# Patient Record
Sex: FEMALE | URBAN - METROPOLITAN AREA
[De-identification: names, ages, dates, MRNs, and addresses within clinical notes are randomized per-mention and may not be internally consistent; named-entity substitution may affect disease eponyms.]

---

## 2017-04-19 ENCOUNTER — TELEPHONE (OUTPATIENT)
Dept: FAMILY MEDICINE | Facility: CLINIC | Age: 57
End: 2017-04-19

## 2017-04-19 NOTE — LETTER
[facility]  [address] [place] [id number] [phone]                                                                                                April 26, 2017    Jenny Moss  [address] [address]  [place] [address]        Dear Ms. Moss,    At [facility] we care about your health and well-being. A review of your chart has indicated that you are due for a pap and physical exam. Please contact us at [phone] to schedule your appointment.     If you have already had one or all of the above screening tests at another facility, please call us to update your chart.     You may contact the clinic at [phone] if you have any questions or concerns about this request.      Sincerely,    [facility] Care Staff/ sd

## 2017-04-19 NOTE — TELEPHONE ENCOUNTER
Panel Management Review      Patient has the following on her problem list: None      Composite cancer screening  Chart review shows that this patient is due/due soon for the following Pap Smear, Mammogram and Colonoscopy  Summary:    Patient is due/failing the following:   COLONOSCOPY, LDL, MAMMOGRAM and PAP    Action needed:   Patient needs office visit for a physical with pap smear and fasting labs.    Type of outreach:    Phone, left message for patient to call back.     Questions for provider review:    None                                                                                                                                    Vandana Farooq MA      Chart routed to Care Team .

## 2017-06-10 ENCOUNTER — HEALTH MAINTENANCE LETTER (OUTPATIENT)
Age: 57
End: 2017-06-10

## 2017-10-07 ENCOUNTER — NURSE TRIAGE (OUTPATIENT)
Dept: NURSING | Facility: CLINIC | Age: 57
End: 2017-10-07

## 2017-10-07 DIAGNOSIS — J20.9 ACUTE BRONCHITIS: Primary | ICD-10-CM

## 2017-10-07 DIAGNOSIS — J20.9 ACUTE BRONCHITIS: ICD-10-CM

## 2017-10-07 RX ORDER — ALBUTEROL SULFATE 90 UG/1
2 AEROSOL, METERED RESPIRATORY (INHALATION) EVERY 6 HOURS PRN
Qty: 1 INHALER | Refills: 1 | Status: CANCELLED | OUTPATIENT
Start: 2017-10-07

## 2017-10-07 RX ORDER — ALBUTEROL SULFATE 90 UG/1
2 AEROSOL, METERED RESPIRATORY (INHALATION) EVERY 6 HOURS PRN
Qty: 1 INHALER | Refills: 0 | OUTPATIENT
Start: 2017-10-07 | End: 2018-11-07

## 2017-10-07 NOTE — TELEPHONE ENCOUNTER
"  Reason for Disposition    [1] Request for URGENT new prescription or refill of \"essential\" medication (i.e., likelihood of harm to patient if not taken) AND [2] triager unable to fill per unit policy    Additional Information    Negative: Drug overdose and nurse unable to answer question    Negative: Caller requesting information not related to medicine    Negative: Caller requesting a prescription for Strep throat and has a positive culture result    Negative: Rash while taking a medication or within 3 days of stopping it    Negative: Immunization reaction suspected    Negative: [1] Asthma and [2] having symptoms of asthma (cough, wheezing, etc)    Negative: MORE THAN A DOUBLE DOSE of a prescription or over-the-counter (OTC) drug    Negative: [1] DOUBLE DOSE (an extra dose or lesser amount) of over-the-counter (OTC) drug AND [2] any symptoms (e.g., dizziness, nausea, pain, sleepiness)    Negative: [1] DOUBLE DOSE (an extra dose or lesser amount) of prescription drug AND [2] any symptoms (e.g., dizziness, nausea, pain, sleepiness)    Negative: Took another person's prescription drug    Negative: Diabetes drug error or overdose (e.g., insulin or extra dose)    Negative: [1] DOUBLE DOSE (an extra dose or lesser amount) of prescription drug AND [2] NO symptoms (Exception: a double dose of antibiotics)    Protocols used: MEDICATION QUESTION CALL-ADULT-    "

## 2017-10-07 NOTE — TELEPHONE ENCOUNTER
Patient called saying she is out of her Ventolin Inhaler,  [facility] has not filled this for the patient in quite some time so I do not know when it was last filled or anything.  Send a new order to [facility] in your clinic if appropriate.    Thanks.  R[facility]

## 2017-10-07 NOTE — TELEPHONE ENCOUNTER
Patient requesting on call provider be paged to refill Albuterol inhaler.    Last Written Prescription Date: 8/24/15  Last Fill Quantity: 1, # refills: 1  For acute bronchitis  Last Office Visit with G, P or [facility] prescribing provider:  10/26/16   Future Office Visit:   None. Tried to transfer patient to scheduling to schedule annual exam with provider but call was disconnected.    Unable to refill per refill policy due to no Spirometry on file     Date of Last Asthma Action Plan Letter:   There are no preventive care reminders to display for this patient.   Asthma Control Test: No flowsheet data found.    Date of Last Spirometry Test:   No results found for this or any previous visit.    On call provider for [facility], Andrew Florence MD, was paged at 1035 to call writer at [facility].  Provider returned call at 1040 and gave the following telephone order:    albuterol (PROAIR HFA/PROVENTIL HFA/VENTOLIN HFA) 108 (90 BASE) MCG/ACT Inhaler  Inhale 2 puffs into the lungs every 6 hours as needed for shortness of breath / dyspnea or wheezing, Disp-1 Inhaler, R-0, E-Prescribe    Informed patient that order was sent to preferred pharmacy and that she should schedule a follow up with her provider.    Patient verbalized understanding and states she will call back to schedule.

## 2017-10-08 ENCOUNTER — NURSE TRIAGE (OUTPATIENT)
Dept: NURSING | Facility: CLINIC | Age: 57
End: 2017-10-08

## 2017-10-08 NOTE — TELEPHONE ENCOUNTER
"  Additional Information    Negative: Drug overdose and nurse unable to answer question    Negative: Caller requesting information not related to medicine    Negative: Caller requesting a prescription for Strep throat and has a positive culture result    Negative: Rash while taking a medication or within 3 days of stopping it    Negative: Immunization reaction suspected    Negative: [1] Asthma and [2] having symptoms of asthma (cough, wheezing, etc)    Negative: MORE THAN A DOUBLE DOSE of a prescription or over-the-counter (OTC) drug    Negative: [1] DOUBLE DOSE (an extra dose or lesser amount) of over-the-counter (OTC) drug AND [2] any symptoms (e.g., dizziness, nausea, pain, sleepiness)    Negative: [1] DOUBLE DOSE (an extra dose or lesser amount) of prescription drug AND [2] any symptoms (e.g., dizziness, nausea, pain, sleepiness)    Negative: Took another person's prescription drug    Negative: [1] DOUBLE DOSE (an extra dose or lesser amount) of prescription drug AND [2] NO symptoms (Exception: a double dose of antibiotics)    Negative: Diabetes drug error or overdose (e.g., insulin or extra dose)    Negative: [1] Request for URGENT new prescription or refill of \"essential\" medication (i.e., likelihood of harm to patient if not taken) AND [2] triager unable to fill per unit policy    Negative: [1] Prescription not at pharmacy AND [2] was prescribed today by PCP    Negative: Pharmacy calling with prescription questions and triager unable to answer question    Negative: Caller has URGENT medication question about med that PCP prescribed and triager unable to answer question    Negative: Caller has NON-URGENT medication question about med that PCP prescribed and triager unable to answer question    Negative: Caller requesting a NON-URGENT new prescription or refill and triager unable to refill per unit policy    Negative: Caller has medication question about med not prescribed by PCP and triager unable to answer " "question (e.g., compatibility with other med, storage)    Negative: [1] DOUBLE DOSE (an extra dose or lesser amount) of over-the-counter (OTC) drug AND [2] NO symptoms (all triage questions negative)    Negative: [1] DOUBLE DOSE (an extra dose or lesser amount) of antibiotic drug AND [2] NO symptoms (all triage questions negative)    Negative: Caller has medication question only, adult not sick, and triager answers question    Negative: Caller has medication question, adult has minor symptoms, caller declines triage, and triager answers question    Negative: Caller requesting information about medication during pregnancy; adult is not ill and triager answers question    Negative: Caller requesting information about medication use with breastfeeding; neither adult nor infant is ill, and triager answers question    Caller requesting a refill, no triage required, and triager able to refill per unit policy     Jenny calling concerned that her albuterol refill \"isn't at the pharmacy, they don't have any record of it\". This nurse spoke to Pharmacist at [facility] in [place], [place] ([place]?) and informed him that the med refill was called in yesterday around 12 pm by another [facility] nurse. He stated \"it isn't in our system\". This nurse reiterated the verbal order for the albuterol inhaler refill via information in EPIC, with no additional refills. This nurse relayed this information to Jenny and reminded her there are no additional refills and she needs to schedule a PCP follow up appointment.     Izzy Marquez RN  [facility]    Protocols used: MEDICATION QUESTION CALL-ADULT-    "

## 2018-04-04 ENCOUNTER — DOCUMENTATION ONLY (OUTPATIENT)
Dept: NURSING | Facility: CLINIC | Age: 58
End: 2018-04-04

## 2018-04-04 NOTE — PROGRESS NOTES
"Patient presents to clinic requesting a note for her work that states she has asthma.  Chart reviewed and no diagnosis of asthma.  Patient was last seen in clinic in 2016 for chest pain.  Patient states she saw someone awhile back who diagnosed her with asthma.  Most recent visit with provider for respiratory issues was with Dr. Nair on 8/24/15, at which time she was diagnosed with acute bronchitis and prescribed albuterol inhaler.  Patient is agitated in the office, states she has had asthma for 13 years and wants to know why it isn't on her record.  Writer looked back as far as could go in electronic chart, 10 years, and no mention of asthma or visits for asthma. Patient states she has missed work a lot due to being ill, and her work is asking for a letter from her doctor regarding this.  When writer asked about her illness, she provided a story about the \"towels that they hang let cotton pieces in the air\".  Writer notified her that she would need to see the provider to be evaluated and talk about this diagnosis, but she refuses.  She wants her records from when she was diagnosed with asthma.  Writer informed her that these are likely archived and would be in paper form, and she would need to sign a release to obtain these.   staff assisted her with filling out PEDRO and directed her to [place], where she can  her file.  Writer instructed her to follow-up with the clinic and make an appointment if she doesn't find what she needs in her records.    Abeba Smith RN    "

## 2018-05-31 ENCOUNTER — OFFICE VISIT (OUTPATIENT)
Dept: FAMILY MEDICINE | Facility: CLINIC | Age: 58
End: 2018-05-31
Payer: MEDICAID

## 2018-05-31 VITALS
WEIGHT: 136 LBS | HEIGHT: 63 IN | HEART RATE: 88 BPM | SYSTOLIC BLOOD PRESSURE: 122 MMHG | BODY MASS INDEX: 24.1 KG/M2 | DIASTOLIC BLOOD PRESSURE: 64 MMHG | TEMPERATURE: 98.8 F

## 2018-05-31 DIAGNOSIS — M62.838 TRAPEZIUS MUSCLE SPASM: ICD-10-CM

## 2018-05-31 DIAGNOSIS — M77.11 LATERAL EPICONDYLITIS OF BOTH ELBOWS: Primary | ICD-10-CM

## 2018-05-31 DIAGNOSIS — M77.12 LATERAL EPICONDYLITIS OF BOTH ELBOWS: Primary | ICD-10-CM

## 2018-05-31 DIAGNOSIS — M75.22 BICEPS TENDONITIS, LEFT: ICD-10-CM

## 2018-05-31 PROCEDURE — 96372 THER/PROPH/DIAG INJ SC/IM: CPT | Performed by: FAMILY MEDICINE

## 2018-05-31 PROCEDURE — 99214 OFFICE O/P EST MOD 30 MIN: CPT | Mod: 25 | Performed by: FAMILY MEDICINE

## 2018-05-31 RX ORDER — CYCLOBENZAPRINE HCL 10 MG
5-10 TABLET ORAL 3 TIMES DAILY PRN
Qty: 30 TABLET | Refills: 1 | Status: SHIPPED | OUTPATIENT
Start: 2018-05-31 | End: 2018-05-31

## 2018-05-31 RX ORDER — CYCLOBENZAPRINE HCL 10 MG
5-10 TABLET ORAL 3 TIMES DAILY PRN
Qty: 30 TABLET | Refills: 1 | Status: SHIPPED | OUTPATIENT
Start: 2018-05-31 | End: 2018-11-12

## 2018-05-31 RX ORDER — IBUPROFEN 600 MG/1
600 TABLET, FILM COATED ORAL
Qty: 60 TABLET | Refills: 0 | Status: SHIPPED | OUTPATIENT
Start: 2018-05-31 | End: 2018-05-31

## 2018-05-31 RX ORDER — KETOROLAC TROMETHAMINE 30 MG/ML
30 INJECTION, SOLUTION INTRAMUSCULAR; INTRAVENOUS ONCE
Qty: 1 ML | Refills: 0 | OUTPATIENT
Start: 2018-05-31 | End: 2018-05-31

## 2018-05-31 RX ORDER — IBUPROFEN 600 MG/1
600 TABLET, FILM COATED ORAL
Qty: 60 TABLET | Refills: 0 | Status: SHIPPED | OUTPATIENT
Start: 2018-05-31

## 2018-05-31 ASSESSMENT — PAIN SCALES - GENERAL: PAINLEVEL: WORST PAIN (10)

## 2018-05-31 NOTE — NURSING NOTE
Prior to injection verified patient identity using patient's name and date of birth.  Due to injection administration, patient instructed to remain in clinic for 15 minutes  afterwards, and to report any adverse reaction to me immediately.    The following medication was given:     MEDICATION: Toradol 30 mg  ROUTE: IM  SITE: RUQ - Gluteus  DOSE: 1 ml  LOT #: [id number]  :  [place]  EXPIRATION DATE:  10/2019  NDC#: 19784-540-99    Bev Morgan CMA ([facility])

## 2018-05-31 NOTE — PROGRESS NOTES
SUBJECTIVE:   Jenny Moss is a 58 year old female who presents to clinic today for the following health issues:      Joint Pain    Onset:2 weeks     Description:   Location: left shoulder  Character: Sharp and Dull ache    Intensity: moderate    Progression of Symptoms: worse    Accompanying Signs & Symptoms:  Other symptoms: radiation of pain to right shoulder and upper back      History:   Previous similar pain: no       Precipitating factors:   Trauma or overuse: YES- helped her son lift a heavy object     Alleviating factors:  Improved by: heat    Therapies Tried and outcome: Heat helped some     She has been having pain in both shoulders that is radiating down to her mid forearm. She is also having some pain in her mid upper back. The left seems to be worse than the left. She feels that the worst pain is in her elbows and behind her shoulders. She was feeling the pain a few days after lifting something heavy, and she is unsure if this is related. She states that she has been having trouble sleeping because of the pain. She feels it is just getting worse.     Skin:  She is concerned about a small lump that she had on her left lower back. She notes that she was stung by a wasp about 2 years ago, and is concerned about the stinger still being in her skin.         Problem list and histories reviewed & adjusted, as indicated.  Additional history: as documented    BP Readings from Last 3 Encounters:   05/31/18 122/64   10/25/16 132/82   03/11/16 124/80    Wt Readings from Last 3 Encounters:   05/31/18 136 lb (61.7 kg)   10/25/16 147 lb (66.7 kg)   03/11/16 144 lb (65.3 kg)                    Reviewed and updated as needed this visit by clinical staff  Tobacco  Allergies  Meds  Med Hx  Surg Hx  Fam Hx  Soc Hx      Reviewed and updated as needed this visit by Provider         ROS:  Constitutional, HEENT, cardiovascular, pulmonary, gi and gu systems are negative, except as otherwise noted.    The information  "in this document, created by the medical scribe Lora Iyer for me, accurately reflects the services I personally performed and the decisions made by me. I have reviewed and approved this document for accuracy prior to leaving the patient care area.    OBJECTIVE:     /64 (Cuff Size: Adult Regular)  Pulse 88  Temp 98.8  F (37.1  C) (Oral)  Ht 5' 3\" (1.6 m)  Wt 136 lb (61.7 kg)  BMI 24.09 kg/m2  Body mass index is 24.09 kg/(m^2).  GENERAL: healthy, alert and no distress  HENT: ear canals and TM's normal, nose and mouth without ulcers or lesions  MS: Tenderness in the epicondyl bilaterally. Anterior shoulder bilaterally. Non-tender rotator cuff, biceps tendon, and deltoid tendon on right. Mild tenderness of left rotator cuff and biceps tendon. Left trapezius in spasm. Tenderness over trapezius to lower back. Normal painless range of motion.   SKIN: 2 cm by 1 cm lipoma on the left side. no suspicious lesions or rashes  NEURO: Normal strength and tone, mentation intact and speech normal  PSYCH: mentation appears normal, affect normal/bright    Diagnostic Test Results:  none     ASSESSMENT/PLAN:       ICD-10-CM    1. Lateral epicondylitis of both elbows M77.11 ketorolac (TORADOL) 30 MG/ML injection    M77.12 ibuprofen (ADVIL/MOTRIN) 600 MG tablet     DISCONTINUED: ibuprofen (ADVIL/MOTRIN) 600 MG tablet   2. Biceps tendonitis, left M75.22 ketorolac (TORADOL) 30 MG/ML injection     ibuprofen (ADVIL/MOTRIN) 600 MG tablet     DISCONTINUED: ibuprofen (ADVIL/MOTRIN) 600 MG tablet   3. Trapezius muscle spasm M62.838 cyclobenzaprine (FLEXERIL) 10 MG tablet     KETOROLAC TROMETHAMINE 15MG     DISCONTINUED: cyclobenzaprine (FLEXERIL) 10 MG tablet       She received a shot of Toradol in clinic today. I prescribed the patient ibuprofen TID with meals for 7 to 10 days for her biceps tendonitis. I also prescribed her flexeril for her trapezius muscle spasm. I provided her with exercises for her tennis elbow, but advised her " to wait a week before starting them. She will follow up PRN.     Patient Instructions   Ice is best for your tennis elbow, but heat is better for your shoulder and back.     Do your exercises at home, but wait until you have taken the ibuprofen for about 1 week.             Robina Nair, DO  [facility]

## 2018-05-31 NOTE — MR AVS SNAPSHOT
"              After Visit Summary   5/31/2018    Jenny Moss    MRN: [id number]           Patient Information     Date Of Birth          1960        Visit Information        Provider Department      5/31/2018 9:20 AM Robina Nair DO [facility]        Today's Diagnoses     Lateral epicondylitis of both elbows    -  1    Biceps tendonitis, left        Trapezius muscle spasm          Care Instructions    Ice is best for your tennis elbow, but heat is better for your shoulder and back.     Do your exercises at home, but wait until you have taken the ibuprofen for about 1 week.                 Follow-ups after your visit        Follow-up notes from your care team     Return if symptoms worsen or fail to improve.      Who to contact     If you have questions or need follow up information about today's clinic visit or your schedule please contact [facility] [place] [facility] directly at [phone].  Normal or non-critical lab and imaging results will be communicated to you by MyChart, letter or phone within 4 business days after the clinic has received the results. If you do not hear from us within 7 days, please contact the clinic through MyChart or phone. If you have a critical or abnormal lab result, we will notify you by phone as soon as possible.  Submit refill requests through [employer] or call your pharmacy and they will forward the refill request to us. Please allow 3 business days for your refill to be completed.          Additional Information About Your Visit        Care EveryWhere ID     This is your Care EveryWhere ID. This could be used by other organizations to access your [facility] medical records  [id number]        Your Vitals Were     Pulse Temperature Height BMI (Body Mass Index)          88 98.8  F (37.1  C) (Oral) 5' 3\" (1.6 m) 24.09 kg/m2         Blood Pressure from Last 3 Encounters:   05/31/18 122/64   10/25/16 132/82   03/11/16 124/80    Weight from Last 3 Encounters: "   05/31/18 136 lb (61.7 kg)   10/25/16 147 lb (66.7 kg)   03/11/16 144 lb (65.3 kg)              Today, you had the following     No orders found for display         Today's Medication Changes          These changes are accurate as of 5/31/18 10:12 AM.  If you have any questions, ask your nurse or doctor.               Start taking these medicines.        Dose/Directions    cyclobenzaprine 10 MG tablet   Commonly known as:  FLEXERIL   Used for:  Trapezius muscle spasm   Started by:  Robina Nair DO        Dose:  5-10 mg   Take 0.5-1 tablets (5-10 mg) by mouth 3 times daily as needed for muscle spasms   Quantity:  30 tablet   Refills:  1       ibuprofen 600 MG tablet   Commonly known as:  ADVIL/MOTRIN   Used for:  Lateral epicondylitis of both elbows, Biceps tendonitis, left   Started by:  Robina Nair DO        Dose:  600 mg   Take 1 tablet (600 mg) by mouth 3 times daily (with meals) For 7-10 days   Quantity:  60 tablet   Refills:  0       ketorolac 30 MG/ML injection   Commonly known as:  TORADOL   Used for:  Lateral epicondylitis of both elbows, Biceps tendonitis, left   Started by:  Robina Nair DO        Dose:  30 mg   Inject 1 mL (30 mg) into the muscle once for 1 dose   Quantity:  1 mL   Refills:  0            Where to get your medicines      These medications were sent to [facility] [address].  [address] [place] [address], [place] [address]     Phone:  [phone]     cyclobenzaprine 10 MG tablet    ibuprofen 600 MG tablet         Some of these will need a paper prescription and others can be bought over the counter.  Ask your nurse if you have questions.     You don't need a prescription for these medications     ketorolac 30 MG/ML injection                Primary Care Provider Office Phone # Fax #    Michelle Parr PA-C [phone]       [address] [place] [address]        Equal Access to Services     ANGELICA MORA AH: Amber  bhavya Garcia, wapamellada luqadaha, qaybta kaalmada xiang, waxpilar kenneth fajardovesnastaci leslie aravind. So [facility] [phone].    ATENCIÓN: Si habla adeola, tiene a woods disposición servicios gratuitos de asistencia lingüística. Percy al [phone].    We comply with applicable federal civil rights laws and [place] laws. We do not discriminate on the basis of race, color, national origin, age, disability, sex, sexual orientation, or gender identity.            Thank you!     Thank you for choosing [facility]  for your care. Our goal is always to provide you with excellent care. Hearing back from our patients is one way we can continue to improve our services. Please take a few minutes to complete the written survey that you may receive in the mail after your visit with us. Thank you!             Your Updated Medication List - Protect others around you: Learn how to safely use, store and throw away your medicines at www.disposemymeds.org.          This list is accurate as of 5/31/18 10:12 AM.  Always use your most recent med list.                   Brand Name Dispense Instructions for use Diagnosis    albuterol 108 (90 Base) MCG/ACT Inhaler    PROAIR HFA/PROVENTIL HFA/VENTOLIN HFA    1 Inhaler    Inhale 2 puffs into the lungs every 6 hours as needed for shortness of breath / dyspnea or wheezing    Acute bronchitis       cyclobenzaprine 10 MG tablet    FLEXERIL    30 tablet    Take 0.5-1 tablets (5-10 mg) by mouth 3 times daily as needed for muscle spasms    Trapezius muscle spasm       ibuprofen 600 MG tablet    ADVIL/MOTRIN    60 tablet    Take 1 tablet (600 mg) by mouth 3 times daily (with meals) For 7-10 days    Lateral epicondylitis of both elbows, Biceps tendonitis, left       ketorolac 30 MG/ML injection    TORADOL    1 mL    Inject 1 mL (30 mg) into the muscle once for 1 dose    Lateral epicondylitis of both elbows, Biceps tendonitis, left       omeprazole 20 MG CR capsule     priLOSEC    30 capsule    Take 1 capsule (20 mg) by mouth daily    Other chest pain

## 2018-05-31 NOTE — PATIENT INSTRUCTIONS
Ice is best for your tennis elbow, but heat is better for your shoulder and back.     Do your exercises at home, but wait until you have taken the ibuprofen for about 1 week.

## 2018-07-09 ENCOUNTER — TRANSFERRED RECORDS (OUTPATIENT)
Dept: HEALTH INFORMATION MANAGEMENT | Facility: CLINIC | Age: 58
End: 2018-07-09

## 2018-07-09 ENCOUNTER — TELEPHONE (OUTPATIENT)
Dept: FAMILY MEDICINE | Facility: CLINIC | Age: 58
End: 2018-07-09

## 2018-07-09 NOTE — TELEPHONE ENCOUNTER
"Jenny Moss is a 58 year old female who calls with cough, chest discomfort, other symptoms.    NURSING ASSESSMENT:  Description:  Patient reports she started with a cough about 10 days ago, has been coughing up some phlegm, which she reports is clear/white.  She is now losing her voice.  She reports she has also been experiencing some discomfort in throat and chest, which sometimes \"moves around\".  She is somewhat difficult to triage, as answers change, are not clear and sometimes requires redirection.  She states she may have had a fever, but with the weather being hot, she isn't sure.  She also reports a \"sensation\" down in her throat, that also occurs \"between my breasts\".  When asked about epigastric pain, she states she does have discomfort down below her ribcage sometimes also.  She reports her pain comes and goes.  She reports some SOB, but states this is due to her smoking.  She denies dizziness, nausea or vomiting, but reports sometimes her pain moves to her jaw and up by her ears as well.  Nothing seems to make the pain better or worse.  She denies pain right at this moment, but states it's been going on \"all of the time\" and frequently in the past several days, and she \"wants to figure out what's going on.\"    Onset/duration:  See above  Precip. factors:  NA  Associated symptoms:  See above  Improves/worsens symptoms:  NA  Allergies: No Known Allergies    NURSING PLAN: Nursing advice to patient When she mentioned chest discomfort that \"moves around' and up to jaw, recommended ED visit, but she does not want to go to ED.    RECOMMENDED DISPOSITION:  Scheduled visit with Dr. Nair tomorrow per request and instructed that if her chest discomfort comes back and is unrelieved, is accompanied by jaw, arm, shoulder pain, nausea/vomiting, dizziness or severe SOB, she needs to be seen in the ED.  She voices understanding.    Will comply with recommendation: Yes  If further questions/concerns or if symptoms do " "not improve, worsen or new symptoms develop, call your PCP or [facility] as soon as possible.      Guideline used:  Telephone Triage Protocols for Nurses, Fifth Edition, Leena Beckman  \"Chest Pain\"  \"Cough\"    VALERIA GRIFFIN RN      "

## 2018-08-27 ENCOUNTER — TELEPHONE (OUTPATIENT)
Dept: FAMILY MEDICINE | Facility: CLINIC | Age: 58
End: 2018-08-27

## 2018-08-27 NOTE — TELEPHONE ENCOUNTER
Per outreach, patient is aware of overdue screening and will call on their own time for scheduling.     Screening: Preventive Health Screening [facility]amm[facility]    Thanks

## 2018-08-29 ENCOUNTER — OFFICE VISIT (OUTPATIENT)
Dept: FAMILY MEDICINE | Facility: CLINIC | Age: 58
End: 2018-08-29
Payer: COMMERCIAL

## 2018-08-29 VITALS
DIASTOLIC BLOOD PRESSURE: 88 MMHG | WEIGHT: 133 LBS | SYSTOLIC BLOOD PRESSURE: 138 MMHG | TEMPERATURE: 98.6 F | OXYGEN SATURATION: 99 % | HEART RATE: 91 BPM | HEIGHT: 63 IN | BODY MASS INDEX: 23.57 KG/M2

## 2018-08-29 DIAGNOSIS — R49.0 HOARSENESS OF VOICE: ICD-10-CM

## 2018-08-29 DIAGNOSIS — R10.13 ABDOMINAL PAIN, EPIGASTRIC: Primary | ICD-10-CM

## 2018-08-29 LAB
HGB BLD-MCNC: 14.6 G/DL (ref 11.7–15.7)
LIPASE SERPL-CCNC: 103 U/L (ref 73–393)

## 2018-08-29 PROCEDURE — 36415 COLL VENOUS BLD VENIPUNCTURE: CPT | Performed by: PHYSICIAN ASSISTANT

## 2018-08-29 PROCEDURE — 83690 ASSAY OF LIPASE: CPT | Performed by: PHYSICIAN ASSISTANT

## 2018-08-29 PROCEDURE — 84443 ASSAY THYROID STIM HORMONE: CPT | Performed by: PHYSICIAN ASSISTANT

## 2018-08-29 PROCEDURE — 99214 OFFICE O/P EST MOD 30 MIN: CPT | Performed by: PHYSICIAN ASSISTANT

## 2018-08-29 PROCEDURE — 85018 HEMOGLOBIN: CPT | Performed by: PHYSICIAN ASSISTANT

## 2018-08-29 PROCEDURE — 80053 COMPREHEN METABOLIC PANEL: CPT | Performed by: PHYSICIAN ASSISTANT

## 2018-08-29 RX ORDER — SUCRALFATE 1 G/1
1 TABLET ORAL 4 TIMES DAILY
Qty: 40 TABLET | Refills: 1 | Status: SHIPPED | OUTPATIENT
Start: 2018-08-29 | End: 2018-11-12

## 2018-08-29 NOTE — PATIENT INSTRUCTIONS
Michelle or her team will be in touch with you with results.  Sucralfate 1 tablet 4 times daily   Take Omeprazole 1 tablet daily  Look at diet modifications below-try to cut down on coffee, smoking.    See Michelle in 2 weeks.    Michelle Parr PA-C                  Heartburn/GERD   What is heartburn?   Heartburn refers to the symptoms you feel when acids in your stomach flow back into the esophagus. (The esophagus is the tube that carries food from your throat to your stomach.) This backward movement of stomach acid is called reflux. The acid can burn and irritate the esophagus, throat, and vocal cords.   Heartburn is a common problem. Despite its name, it has nothing to do with the heart.   When you have heartburn often, you may have a condition called gastroesophageal reflux disease, or GERD.   How does it occur?   At the bottom of the esophagus there is a ring of muscle called a sphincter. It acts like a valve. When you swallow food, the sphincter opens to let the food pass into the stomach. The ring then closes to keep the stomach contents from going back into the esophagus. If the sphincter is weak or too relaxed, stomach acid and food flow backward into the esophagus. Because the esophagus does not have the protective lining that the stomach has, the acid causes pain.   The sphincter muscle sometimes does not work properly if:   You are overweight.   You are pregnant.   You have a hiatal hernia (a condition in which part of the stomach protrudes through the diaphragm into the chest).   You eat too much.   You lie down soon after eating.   You wear tight clothes that push on your stomach.   Foods that may make heartburn worse are:   foods high in fat   sugar   chocolate   peppermint   onions   citrus foods such as orange juice   tomato-based foods   spicy foods   coffee and other drinks with caffeine, such as tea and rodolfo   alcohol.   Heartburn can also be made worse by:   taking certain medicines, such as  aspirin   smoking cigarettes.   Anyone can have an attack of heartburn from overeating or eating foods that are high in acid. Most of the time heartburn is mild and lasts for a short time. There is usually not a problem when heartburn occurs just once in a while. You should see your healthcare provider if:   You have heartburn nearly every day for 2 weeks.   The heartburn comes back when the antacid wears off.   Heartburn wakes you up at night.   What are the symptoms?   The main symptom of heartburn is a burning pain in the lower chest, usually close to the bottom of the breastbone. Other symptoms you may have are:   acid or sour taste in your mouth   belching   a feeling of bloating or fullness in the stomach.   These symptoms tend to happen after very large meals and especially with activity such as bending or lifting after meals. The symptoms may be made worse by lying down or by wearing tight clothing.   Heartburn is very common during the last few months of pregnancy. The weight of the baby pushes on the stomach and can cause the sphincter to relax and let acid to flow back into the esophagus.   How is it diagnosed?   Usually heartburn can be diagnosed from your medical history.   If there is any question about the diagnosis, you may have the following tests to check for ulcers or other problems that might cause your symptoms:   barium swallow X-ray study of the esophagus   complete upper GI (gastrointestinal) barium X-ray study of the esophagus, stomach, and upper intestine   endoscopy, a procedure in which a thin flexible tube with a tiny camera is placed in your mouth and down into your stomach so your provider can see your esophagus and stomach.   How is it treated?   To help reduce the symptoms of heartburn you can:   Try not to put a lot of pressure on the sphincter muscle. Eating light meals and wearing loose clothing will help.   Lose weight if you are overweight.   Take nonprescription antacids  (tablets or liquid) after meals and at bedtime.   Raise the head of your bed or use more than one pillow so your head is higher than your stomach. This may allow gravity to help keep food from backing up.   If you find that certain foods or drinks seem to cause your symptoms or make them worse, avoid those foods.   If the simple measures described above do not relieve the symptoms, your healthcare provider may prescribe medicine. The prescription medicines help reduce stomach acid. They also help stomach emptying. A very few people who are not helped with medicines may need surgery.   Get emergency care if the following symptoms occur with the heartburn and do not go away within 15 minutes of treatment for heartburn: shortness of breath; sweating; light-headedness, weakness; or jaw, arm, back, or chest pain.   How long will the effects last?   Heartburn symptoms are usually relieved by treatment in just a few hours or less. If you are having heartburn every day, starting treatment will usually relieve the symptoms in a few days. However, the symptoms may come back from time to time, especially if you gain weight.   Heartburn can sometimes make asthma worse. If you have asthma, preventing or controlling heartburn may help control your asthma symptoms.   How can I help prevent heartburn?   The best prevention is to:   Keep a healthy weight. Lose weight if you are overweight.   Sleep with your head elevated at least 4 to 6 inches. (It's usually most comfortable to put the head of your bed on blocks.)   It may also help if you:   Wait an hour or longer after eating before you lie down. If you have to lie down after a meal, lie on your left side. Keep your head and shoulders slightly higher than the rest of your body. It's best to not eat for 2 to 3 hours before you go to bed.   Eat smaller, more frequent meals.   Avoid wearing tight clothing or belts.   Don't smoke. Smoking relaxes the sphincter leading to your stomach.    Avoid foods and other things that seem to cause heartburn or make it worse.   Developed by [employer].   Published by [employer].   Last modified: 2009-01-14   Last reviewed: 2008-12-02

## 2018-08-29 NOTE — LETTER
[address] [id number] [phone]                                                                                                August 31, 2018    Jenny Moss   [address] [address]  [place] [address]        Dear Ms. Moss,    Your recent lab results were within normal limits. A copy of those results are included with this letter.      Sincerely,      Michelle Parr PA-C/francia    Results for orders placed or performed in visit on 08/29/18   TSH with free T4 reflex   Result Value Ref Range    TSH 0.86 0.40 - 4.00 mU/L   Hemoglobin   Result Value Ref Range    Hemoglobin 14.6 11.7 - 15.7 g/dL   Comprehensive metabolic panel   Result Value Ref Range    Sodium 137 133 - 144 mmol/L    Potassium 4.5 3.4 - 5.3 mmol/L    Chloride 102 94 - 109 mmol/L    Carbon Dioxide 29 20 - 32 mmol/L    Anion Gap 6 3 - 14 mmol/L    Glucose 86 70 - 99 mg/dL    Urea Nitrogen 9 7 - 30 mg/dL    Creatinine 0.59 0.52 - 1.04 mg/dL    GFR Estimate >90 >60 mL/min/1.7m2    GFR Estimate If Black >90 >60 mL/min/1.7m2    Calcium 9.6 8.5 - 10.1 mg/dL    Bilirubin Total 0.6 0.2 - 1.3 mg/dL    Albumin 4.0 3.4 - 5.0 g/dL    Protein Total 8.3 6.8 - 8.8 g/dL    Alkaline Phosphatase 94 40 - 150 U/L    ALT 18 0 - 50 U/L    AST 14 0 - 45 U/L   Lipase   Result Value Ref Range    Lipase 103 73 - 393 U/L

## 2018-08-29 NOTE — LETTER
[address] [id number] [phone]          August 29, 2018    RE:  Jenny Moss                                                                                                                                                       [address] [address]  [place] [address]            To whom it may concern:    Jenny Moss is under my professional care for Abdominal pain, epigastric     Please excuse her absence today, and the following 2 work days.    Sincerely,          Michelle Parr PA-C

## 2018-08-30 LAB
ALBUMIN SERPL-MCNC: 4 G/DL (ref 3.4–5)
ALP SERPL-CCNC: 94 U/L (ref 40–150)
ALT SERPL W P-5'-P-CCNC: 18 U/L (ref 0–50)
ANION GAP SERPL CALCULATED.3IONS-SCNC: 6 MMOL/L (ref 3–14)
AST SERPL W P-5'-P-CCNC: 14 U/L (ref 0–45)
BILIRUB SERPL-MCNC: 0.6 MG/DL (ref 0.2–1.3)
BUN SERPL-MCNC: 9 MG/DL (ref 7–30)
CALCIUM SERPL-MCNC: 9.6 MG/DL (ref 8.5–10.1)
CHLORIDE SERPL-SCNC: 102 MMOL/L (ref 94–109)
CO2 SERPL-SCNC: 29 MMOL/L (ref 20–32)
CREAT SERPL-MCNC: 0.59 MG/DL (ref 0.52–1.04)
GFR SERPL CREATININE-BSD FRML MDRD: >90 ML/MIN/1.7M2
GLUCOSE SERPL-MCNC: 86 MG/DL (ref 70–99)
POTASSIUM SERPL-SCNC: 4.5 MMOL/L (ref 3.4–5.3)
PROT SERPL-MCNC: 8.3 G/DL (ref 6.8–8.8)
SODIUM SERPL-SCNC: 137 MMOL/L (ref 133–144)
TSH SERPL DL<=0.005 MIU/L-ACNC: 0.86 MU/L (ref 0.4–4)

## 2018-09-12 ENCOUNTER — RADIANT APPOINTMENT (OUTPATIENT)
Dept: GENERAL RADIOLOGY | Facility: CLINIC | Age: 58
End: 2018-09-12
Attending: PHYSICIAN ASSISTANT
Payer: COMMERCIAL

## 2018-09-12 ENCOUNTER — OFFICE VISIT (OUTPATIENT)
Dept: FAMILY MEDICINE | Facility: CLINIC | Age: 58
End: 2018-09-12
Payer: COMMERCIAL

## 2018-09-12 VITALS
HEART RATE: 96 BPM | HEIGHT: 63 IN | DIASTOLIC BLOOD PRESSURE: 56 MMHG | SYSTOLIC BLOOD PRESSURE: 108 MMHG | WEIGHT: 131 LBS | BODY MASS INDEX: 23.21 KG/M2 | TEMPERATURE: 98.5 F

## 2018-09-12 DIAGNOSIS — R10.13 ABDOMINAL PAIN, EPIGASTRIC: ICD-10-CM

## 2018-09-12 DIAGNOSIS — M54.6 MIDLINE THORACIC BACK PAIN, UNSPECIFIED CHRONICITY: ICD-10-CM

## 2018-09-12 DIAGNOSIS — M54.6 MIDLINE THORACIC BACK PAIN, UNSPECIFIED CHRONICITY: Primary | ICD-10-CM

## 2018-09-12 PROCEDURE — 72070 X-RAY EXAM THORAC SPINE 2VWS: CPT | Mod: FY

## 2018-09-12 PROCEDURE — 99214 OFFICE O/P EST MOD 30 MIN: CPT | Performed by: PHYSICIAN ASSISTANT

## 2018-09-12 RX ORDER — CYCLOBENZAPRINE HCL 10 MG
5-10 TABLET ORAL
Qty: 30 TABLET | Refills: 0 | Status: SHIPPED | OUTPATIENT
Start: 2018-09-12

## 2018-09-12 NOTE — MR AVS SNAPSHOT
After Visit Summary   9/12/2018    Jenny Moss    MRN: [id number]           Patient Information     Date Of Birth          1960        Visit Information        Provider Department      9/12/2018 1:00 PM Michelle Parr PA-C [facility]        Today's Diagnoses     Midline thoracic back pain, unspecified chronicity    -  1    Abdominal pain, epigastric          Care Instructions    For back-see exercises below.  Can try heat 2-3 times daily x 20 mintues  Exercises 1 time daily  Muscle relaxant at  Night.    For reflux/abdominal/throat pain-can take omeprazole liquid 1 time daily.    Follow up if symptoms not improving.    Michelle Parr PA-C                    Upper Back Pain          What is upper back pain?   Your upper back is also called your thoracic back, the part of the back where the ribs attach. Upper back pain is pain between your neck and your lower back.   How does it occur?   The bones in your back are called vertebrae. Back pain is usually caused when ligaments or muscles attaching to the vertebrae are injured. Upper back pain can come from a twisting motion, poor posture, overuse, or an injury such as a fall or car accident. It is very common for someone to injure their upper back when carrying objects, throwing, bending or twisting. Sitting at a desk for a long time can cause upper back muscles to tighten and become stiff. Upper back pain can even come from vigorous coughing or sneezing.   Sometimes upper back pain is caused by scoliosis, a curve in the spine that has developed during the adolescent growth period. In scoliosis there is usually an imbalance of the muscles of the upper back.   What are the symptoms?   Symptoms of upper back pain may include:   muscle spasms   pain when you take a deep breath   pain when your back is touched or when you move   pain when you move your shoulders or bend your neck forward   How is it diagnosed?   Your provider  will take your history, review your symptoms, and examine your back.   How is it treated?   To treat this condition:   Put an ice pack, gel pack, or package of frozen vegetables, wrapped in a cloth on the area every 3 to 4 hours, for up to 20 minutes at a time.   After you have iced for 2 to 3 days, you may start to use moist heat to help loosen up stiff muscles. Use moist heat for up to 20 minutes at a time to help relax tight muscles or muscle spasms. Do not use heat if you have swelling.   Take an anti-inflammatory such as ibuprofen, or other medicine as directed by your provider. Nonsteroidal anti-inflammatory medicines (NSAIDs) may cause stomach bleeding and other problems. These risks increase with age. Read the label and take as directed. Unless recommended by your healthcare provider, do not take for more than 10 days.   Get a back massage by a trained person.   Follow your provider's instructions for doing exercises to help you recover.   When can I return to my normal activities?   Everyone recovers from an injury at a different rate. Return to your activities depends on how soon your back recovers, not by how many days or weeks it has been since your injury has occurred. In general, the longer you have symptoms before you start treatment, the longer it will take to get better. The goal of rehabilitation is to return you to your normal activities as soon as is safely possible. If you return too soon you may worsen your injury.   It is important that you have fully recovered from your upper back pain before you return to any strenuous activity. You must be able to have the same range of motion that you had before the injury.   What can I do to prevent upper back pain?   Be sure that you have warmed up and have done proper stretching exercises before your activity. Try not to twist when you are lifting heavy objects. If you are at a desk for a long period of time be sure to take frequent breaks to stretch  you back.     Published by [employer].  This content is reviewed periodically and is subject to change as new health information becomes available. The information is intended to inform and educate and is not a replacement for medical evaluation, advice, diagnosis or treatment by a healthcare professional.   Written by Mariano Cornelius MD.   ? 2010 [employer] and/or its affiliates. All Rights Reserved.   Copyright   [employer] 2011      Upper Back Pain Rehabilitation Exercises   You may do all of these exercises right away.     Pectoralis stretch:  a doorway or corner with both arms on the wall slightly above your head. Slowly lean forward until you feel a stretch in the front of your shoulders. Hold 15 to 30 seconds. Repeat 3 times.     Thoracic extension: While sitting in a chair, clasp both arms behind your head. Gently arch backward and look up toward the ceiling. Repeat 10 times. Do this several times per day.     Arm slides on wall: Sit or stand against a wall with your elbows and wrists against the wall. Slowly slide your arms upward as high as you can while keeping your elbows and wrists against the wall. Do 3 sets of 10.     Scapular squeezes: While sitting or standing with your arms by your sides, squeeze your shoulder blades together and hold for 5 seconds. Do 3 sets of 10.     Mid-trap exercise: Lie on your stomach on a firm surface and place a folded pillow underneath your chest. Place your arms out straight to your sides with your elbows straight and thumbs toward the ceiling. Slowly raise your arms toward the ceiling as you squeeze your shoulder blades together. Lower slowly. Do 3 sets of 15. Progress to holding soup cans or small weights in your hands.     Thoracic stretch   A. Sit on the floor with your legs out straight in front of you. Hold your mid-thighs with your hands. Curl you head and neck toward your belly button. Hold for a count of 15. Repeat 3 times.   B. To  stretch your right upper back, point your right elbow and shoulders forward while twisting your trunk to the left. Hold for a count of 15. Repeat 3 times.   C. To stretch your left upper back, point your left elbow and shoulder forward while twisting your trunk to the right. Hold for a count of 10. Repeat 3 times.     Rowing exercise: Tie a piece of elastic tubing around an immovable object and grasp the ends in each hand. Keep your forearms vertical and your elbows at shoulder level and bent to 90 degrees. Pull backward on the band and squeeze your shoulder blades together. Repeat 10 times. Do 3 sets.   Written by Mariano Cornelius MD for [employer].   Published by [employer].   This content is reviewed periodically and is subject to change as new health information becomes available. The information is intended to inform and educate and is not a replacement for medical evaluation, advice, diagnosis or treatment by a healthcare professional.   Sports Medicine Advisor 2003.1 Index  Sports Medicine Advisor 2003.1 Credits   Copyright   2003 [employer]. All rights reserved.                               Follow-ups after your visit        Who to contact     If you have questions or need follow up information about today's clinic visit or your schedule please contact [facility] directly at [phone].  Normal or non-critical lab and imaging results will be communicated to you by MyChart, letter or phone within 4 business days after the clinic has received the results. If you do not hear from us within 7 days, please contact the clinic through MyChart or phone. If you have a critical or abnormal lab result, we will notify you by phone as soon as possible.  Submit refill requests through [employer] or call your pharmacy and they will forward the refill request to us. Please allow 3 business days for your refill to be completed.          Additional  "Information About Your Visit        Care EveryWhere ID     This is your Care EveryWhere ID. This could be used by other organizations to access your [facility] medical records  [id number]        Your Vitals Were     Pulse Temperature Height BMI (Body Mass Index)          96 98.5  F (36.9  C) (Oral) 5' 3\" (1.6 m) 23.21 kg/m2         Blood Pressure from Last 3 Encounters:   09/12/18 108/56   08/29/18 138/88   05/31/18 122/64    Weight from Last 3 Encounters:   09/12/18 131 lb (59.4 kg)   08/29/18 133 lb (60.3 kg)   05/31/18 136 lb (61.7 kg)                 Today's Medication Changes          These changes are accurate as of 9/12/18  2:22 PM.  If you have any questions, ask your nurse or doctor.               Start taking these medicines.        Dose/Directions    lidocaine (viscous) 15 mL, alum & mag hydroxide-simethicone 15 mL GI Cocktail   Used for:  Abdominal pain, epigastric   Started by:  Michelle Parr PA-C        Dose:  30 mL   Take 30 mLs by mouth once for 1 dose   Quantity:  30 mL   Refills:  0         These medicines have changed or have updated prescriptions.        Dose/Directions    * cyclobenzaprine 10 MG tablet   Commonly known as:  FLEXERIL   This may have changed:  Another medication with the same name was added. Make sure you understand how and when to take each.   Used for:  Trapezius muscle spasm   Changed by:  Michelle Parr PA-C        Dose:  5-10 mg   Take 0.5-1 tablets (5-10 mg) by mouth 3 times daily as needed for muscle spasms   Quantity:  30 tablet   Refills:  1       * cyclobenzaprine 10 MG tablet   Commonly known as:  FLEXERIL   This may have changed:  You were already taking a medication with the same name, and this prescription was added. Make sure you understand how and when to take each.   Used for:  Midline thoracic back pain, unspecified chronicity   Changed by:  Michelle Parr PA-C        Dose:  5-10 mg   Take 0.5-1 tablets (5-10 mg) by mouth nightly as needed for " muscle spasms   Quantity:  30 tablet   Refills:  0       * omeprazole 20 MG CR capsule   Commonly known as:  priLOSEC   This may have changed:  Another medication with the same name was added. Make sure you understand how and when to take each.   Used for:  Abdominal pain, epigastric   Changed by:  Michelle Parr PA-C        Dose:  20 mg   Take 1 capsule (20 mg) by mouth daily   Quantity:  30 capsule   Refills:  1       * omeprazole 2 mg/mL Susp   Commonly known as:  priLOSEC   This may have changed:  You were already taking a medication with the same name, and this prescription was added. Make sure you understand how and when to take each.   Used for:  Abdominal pain, epigastric   Changed by:  Michelle Parr PA-C        Dose:  20 mg   Take 10 mLs (20 mg) by mouth daily   Quantity:  300 mL   Refills:  0       * Notice:  This list has 4 medication(s) that are the same as other medications prescribed for you. Read the directions carefully, and ask your doctor or other care provider to review them with you.         Where to get your medicines      These medications were sent to [facility] [address].  [address] [place] [address]     Phone:  [phone]     cyclobenzaprine 10 MG tablet    omeprazole 2 mg/mL Susp         Some of these will need a paper prescription and others can be bought over the counter.  Ask your nurse if you have questions.     You don't need a prescription for these medications     lidocaine (viscous) 15 mL, alum & mag hydroxide-simethicone 15 mL GI Cocktail                Primary Care Provider Office Phone # Fax #    Michelle Parr PA-C [phone]       [address] [place], [place] [id number]        Equal Access to Services     ANGELICA MORA : Amber Garcia, wadanny mg, qaybta kaaloralia otoole, ambika nazario. So [facility] [phone].    ATENCIÓN: Si  bella mir, tiene a woods disposición servicios gratuitos de asistencia lingüística. Percy palumbo [phone].    We comply with applicable federal civil rights laws and [place] laws. We do not discriminate on the basis of race, color, national origin, age, disability, sex, sexual orientation, or gender identity.            Thank you!     Thank you for choosing [facility]  for your care. Our goal is always to provide you with excellent care. Hearing back from our patients is one way we can continue to improve our services. Please take a few minutes to complete the written survey that you may receive in the mail after your visit with us. Thank you!             Your Updated Medication List - Protect others around you: Learn how to safely use, store and throw away your medicines at www.disposemymeds.org.          This list is accurate as of 9/12/18  2:22 PM.  Always use your most recent med list.                   Brand Name Dispense Instructions for use Diagnosis    albuterol 108 (90 Base) MCG/ACT inhaler    PROAIR HFA/PROVENTIL HFA/VENTOLIN HFA    1 Inhaler    Inhale 2 puffs into the lungs every 6 hours as needed for shortness of breath / dyspnea or wheezing    Acute bronchitis       * cyclobenzaprine 10 MG tablet    FLEXERIL    30 tablet    Take 0.5-1 tablets (5-10 mg) by mouth 3 times daily as needed for muscle spasms    Trapezius muscle spasm       * cyclobenzaprine 10 MG tablet    FLEXERIL    30 tablet    Take 0.5-1 tablets (5-10 mg) by mouth nightly as needed for muscle spasms    Midline thoracic back pain, unspecified chronicity       ibuprofen 600 MG tablet    ADVIL/MOTRIN    60 tablet    Take 1 tablet (600 mg) by mouth 3 times daily (with meals) For 7-10 days    Lateral epicondylitis of both elbows, Biceps tendonitis, left       lidocaine (viscous) 15 mL, alum & mag hydroxide-simethicone 15 mL GI Cocktail     30 mL    Take 30 mLs by mouth once for 1 dose    Abdominal pain, epigastric       *  omeprazole 20 MG CR capsule    priLOSEC    30 capsule    Take 1 capsule (20 mg) by mouth daily    Abdominal pain, epigastric       * omeprazole 2 mg/mL Susp    priLOSEC    300 mL    Take 10 mLs (20 mg) by mouth daily    Abdominal pain, epigastric       sucralfate 1 GM tablet    CARAFATE    40 tablet    Take 1 tablet (1 g) by mouth 4 times daily    Abdominal pain, epigastric       * Notice:  This list has 4 medication(s) that are the same as other medications prescribed for you. Read the directions carefully, and ask your doctor or other care provider to review them with you.

## 2018-09-12 NOTE — NURSING NOTE
The following medication was given and patient tolerated it well:     MEDICATION: GI COCKTAIL  ROUTE: PO  SITE: mouth  DOSE: 30 mLs (15 mL Viscous + 15 mL mag hydroxide-simithicone)  LOT #: Viscous: [id number] and mag hydroxide-simithicone: [id number]  EXPIRATION DATE:  Viscous: 04/2021 and mag hydroxide-simithicone: 06/2019  NDC#: Viscous: 83516-026-14 and mag hydroxide-simithicone: 21592-039-54  Dahlia Grant, CMA

## 2018-09-12 NOTE — PATIENT INSTRUCTIONS
For back-see exercises below.  Can try heat 2-3 times daily x 20 mintues  Exercises 1 time daily  Muscle relaxant at  Night.    For reflux/abdominal/throat pain-can take omeprazole liquid 1 time daily.    Follow up if symptoms not improving.    Michelle Parr PA-C                    Upper Back Pain          What is upper back pain?   Your upper back is also called your thoracic back, the part of the back where the ribs attach. Upper back pain is pain between your neck and your lower back.   How does it occur?   The bones in your back are called vertebrae. Back pain is usually caused when ligaments or muscles attaching to the vertebrae are injured. Upper back pain can come from a twisting motion, poor posture, overuse, or an injury such as a fall or car accident. It is very common for someone to injure their upper back when carrying objects, throwing, bending or twisting. Sitting at a desk for a long time can cause upper back muscles to tighten and become stiff. Upper back pain can even come from vigorous coughing or sneezing.   Sometimes upper back pain is caused by scoliosis, a curve in the spine that has developed during the adolescent growth period. In scoliosis there is usually an imbalance of the muscles of the upper back.   What are the symptoms?   Symptoms of upper back pain may include:   muscle spasms   pain when you take a deep breath   pain when your back is touched or when you move   pain when you move your shoulders or bend your neck forward   How is it diagnosed?   Your provider will take your history, review your symptoms, and examine your back.   How is it treated?   To treat this condition:   Put an ice pack, gel pack, or package of frozen vegetables, wrapped in a cloth on the area every 3 to 4 hours, for up to 20 minutes at a time.   After you have iced for 2 to 3 days, you may start to use moist heat to help loosen up stiff muscles. Use moist heat for up to 20 minutes at a time to help relax  tight muscles or muscle spasms. Do not use heat if you have swelling.   Take an anti-inflammatory such as ibuprofen, or other medicine as directed by your provider. Nonsteroidal anti-inflammatory medicines (NSAIDs) may cause stomach bleeding and other problems. These risks increase with age. Read the label and take as directed. Unless recommended by your healthcare provider, do not take for more than 10 days.   Get a back massage by a trained person.   Follow your provider's instructions for doing exercises to help you recover.   When can I return to my normal activities?   Everyone recovers from an injury at a different rate. Return to your activities depends on how soon your back recovers, not by how many days or weeks it has been since your injury has occurred. In general, the longer you have symptoms before you start treatment, the longer it will take to get better. The goal of rehabilitation is to return you to your normal activities as soon as is safely possible. If you return too soon you may worsen your injury.   It is important that you have fully recovered from your upper back pain before you return to any strenuous activity. You must be able to have the same range of motion that you had before the injury.   What can I do to prevent upper back pain?   Be sure that you have warmed up and have done proper stretching exercises before your activity. Try not to twist when you are lifting heavy objects. If you are at a desk for a long period of time be sure to take frequent breaks to stretch you back.     Published by [employer].  This content is reviewed periodically and is subject to change as new health information becomes available. The information is intended to inform and educate and is not a replacement for medical evaluation, advice, diagnosis or treatment by a healthcare professional.   Written by Mariano Cornelius MD.   ? 2010 [employer] and/or its affiliates. All Rights Reserved.   Copyright    [employer] 2011      Upper Back Pain Rehabilitation Exercises   You may do all of these exercises right away.     Pectoralis stretch:  a doorway or corner with both arms on the wall slightly above your head. Slowly lean forward until you feel a stretch in the front of your shoulders. Hold 15 to 30 seconds. Repeat 3 times.     Thoracic extension: While sitting in a chair, clasp both arms behind your head. Gently arch backward and look up toward the ceiling. Repeat 10 times. Do this several times per day.     Arm slides on wall: Sit or stand against a wall with your elbows and wrists against the wall. Slowly slide your arms upward as high as you can while keeping your elbows and wrists against the wall. Do 3 sets of 10.     Scapular squeezes: While sitting or standing with your arms by your sides, squeeze your shoulder blades together and hold for 5 seconds. Do 3 sets of 10.     Mid-trap exercise: Lie on your stomach on a firm surface and place a folded pillow underneath your chest. Place your arms out straight to your sides with your elbows straight and thumbs toward the ceiling. Slowly raise your arms toward the ceiling as you squeeze your shoulder blades together. Lower slowly. Do 3 sets of 15. Progress to holding soup cans or small weights in your hands.     Thoracic stretch   A. Sit on the floor with your legs out straight in front of you. Hold your mid-thighs with your hands. Curl you head and neck toward your belly button. Hold for a count of 15. Repeat 3 times.   B. To stretch your right upper back, point your right elbow and shoulders forward while twisting your trunk to the left. Hold for a count of 15. Repeat 3 times.   C. To stretch your left upper back, point your left elbow and shoulder forward while twisting your trunk to the right. Hold for a count of 10. Repeat 3 times.     Rowing exercise: Tie a piece of elastic tubing around an immovable object and grasp the ends in each  hand. Keep your forearms vertical and your elbows at shoulder level and bent to 90 degrees. Pull backward on the band and squeeze your shoulder blades together. Repeat 10 times. Do 3 sets.   Written by Mariano Cornelius MD for [employer].   Published by [employer].   This content is reviewed periodically and is subject to change as new health information becomes available. The information is intended to inform and educate and is not a replacement for medical evaluation, advice, diagnosis or treatment by a healthcare professional.   Sports Medicine Advisor 2003.1 Index  Sports Medicine Advisor 2003.1 Credits   Copyright   2003 [employer]. All rights reserved.

## 2018-09-12 NOTE — PROGRESS NOTES
"  SUBJECTIVE:   Jenny Moss is a 58 year old female who presents to clinic today for the following health issues:      Nirali is here today to follow up on upper abdominal pain and mid upper back pain.      She was last seen 2 weeks ago on 8/29/18.  At that time, it was recommended that she take Omeprazole daily x 2 weeks and follow up.  Today, she reports that she was only able to take this for 4-5 days.  On 2 separate occasions she reports that the pill got stuck in her throat.  When she was able to take this medication, she did feel that this was helping the abdominal pain but not the back pain.    She is concerned that her back pain is not related to and 2/2 to her spine.  She has had this pain x 2-3 months.  At this time, she helped her son lift something heavy and feels her pain has been present since.    She denies  black or bloody stools, denies any hematemesis.  Denies any chest pain, palpitations, fatigue, dizziness, lightheadedness, syncope.     -------------------------------------    Problem list and histories reviewed & adjusted, as indicated.  Additional history: as documented    Reviewed and updated as needed this visit by clinical staff  Tobacco  Allergies  Meds  Med Hx  Surg Hx  Fam Hx  Soc Hx      Reviewed and updated as needed this visit by Provider       ROS:  Constitutional, HEENT, cardiovascular, pulmonary, gi and gu systems are negative, except as otherwise noted.    OBJECTIVE:     /56 (Cuff Size: Adult Regular)  Pulse 96  Temp 98.5  F (36.9  C) (Oral)  Ht 5' 3\" (1.6 m)  Wt 131 lb (59.4 kg)  BMI 23.21 kg/m2  Body mass index is 23.21 kg/(m^2).  GENERAL: healthy, alert and no distress  NECK: no adenopathy, no asymmetry, masses, or scars and thyroid normal to palpation  RESP: lungs clear to auscultation - no rales, rhonchi or wheezes  CV: regular rate and rhythm, normal S1 S2, no S3 or S4, no murmur, click or rub, no peripheral edema and peripheral pulses strong  ABDOMEN: " soft, mild epigastric tenderness, no hepatosplenomegaly, no masses and bowel sounds normal  MS: extremities normal- no gross deformities noted. Mild pain with palpation of thoracic spinous processes between shoulder blades. Surrounding musculature with ttp, inflammation and tightness noted as well.  SKIN: no suspicious lesions or rashes  PSYCH: mentation appears normal, affect normal/bright    Diagnostic Test Results:  none     ASSESSMENT/PLAN:   1. Midline thoracic back pain, unspecified chronicity  I have reviewed her x-ray and this appears negative for an acute process. Will await radiology final read  Recommended conservative treatment with rest, ice, nsaids and ROM/stretching exercises. RTC if symptoms worsen or do not continue to improve as anticipated.  Pt understood and agreed with plan.  See pt instructions.  - XR Thoracic Spine 2 Views; Future  - cyclobenzaprine (FLEXERIL) 10 MG tablet; Take 0.5-1 tablets (5-10 mg) by mouth nightly as needed for muscle spasms  Dispense: 30 tablet; Refill: 0    2. Abdominal pain, epigastric  Improvement with GI cocktail, back pain persists.  Given capsules stuck in throat, will switch this to liquid omeprazole  Recommended she take this daily x 2-4 weeks.  Discussed other diet and lifestyle modifications.  - lidocaine (viscous) 15 mL, alum & mag hydroxide-simethicone 15 mL GI Cocktail; Take 30 mLs by mouth once for 1 dose  Dispense: 30 mL; Refill: 0  - omeprazole (PRILOSEC) 2 mg/mL SUSP; Take 10 mLs (20 mg) by mouth daily  Dispense: 300 mL; Refill: 0  - ranitidine (ZANTAC) 150 MG/10ML syrup; Take 10 mLs (150 mg) by mouth 2 times daily  Dispense: 600 mL; Refill: 1    Michelle Parr PA-C  [facility]

## 2018-11-07 ENCOUNTER — TELEPHONE (OUTPATIENT)
Dept: FAMILY MEDICINE | Facility: CLINIC | Age: 58
End: 2018-11-07

## 2018-11-07 ENCOUNTER — ALLIED HEALTH/NURSE VISIT (OUTPATIENT)
Dept: NURSING | Facility: CLINIC | Age: 58
End: 2018-11-07
Payer: COMMERCIAL

## 2018-11-07 VITALS
SYSTOLIC BLOOD PRESSURE: 134 MMHG | TEMPERATURE: 98.4 F | HEART RATE: 96 BPM | WEIGHT: 119.6 LBS | BODY MASS INDEX: 21.19 KG/M2 | DIASTOLIC BLOOD PRESSURE: 70 MMHG

## 2018-11-07 DIAGNOSIS — K21.00 GASTROESOPHAGEAL REFLUX DISEASE WITH ESOPHAGITIS: Primary | ICD-10-CM

## 2018-11-07 DIAGNOSIS — R05.9 COUGH: Primary | ICD-10-CM

## 2018-11-07 PROCEDURE — 99211 OFF/OP EST MAY X REQ PHY/QHP: CPT

## 2018-11-07 RX ORDER — ALBUTEROL SULFATE 90 UG/1
2 AEROSOL, METERED RESPIRATORY (INHALATION) EVERY 6 HOURS PRN
Qty: 1 INHALER | Refills: 0 | Status: SHIPPED | OUTPATIENT
Start: 2018-11-07

## 2018-11-07 NOTE — TELEPHONE ENCOUNTER
Patient seen in clinic as walk in by RN and advised to go to ER. Agrees to do so but is requesting albuterol refill from our pharmacy prior to going.    Will forward to PCP for review and recommendations. Patient is waiting in the lobby. I encouraged her to proceed to ER as recommended but patient persistently asking for albuterol refill.  Inhaler previously prescribed for acute bronchitis but patient reports ongoing use as needed for shortness of breath.  Last refilled 10/7/17.      Jamie Gamboa RN

## 2018-11-07 NOTE — MR AVS SNAPSHOT
After Visit Summary   11/7/2018    Jenny Moss    MRN: [id number]           Patient Information     Date Of Birth          1960        Visit Information        Provider Department      11/7/2018 11:30 AM NE RN [facility]        Today's Diagnoses     Gastroesophageal reflux disease with esophagitis    -  1       Follow-ups after your visit        Who to contact     If you have questions or need follow up information about today's clinic visit or your schedule please contact [facility] [place] directly at [phone].  Normal or non-critical lab and imaging results will be communicated to you by MyChart, letter or phone within 4 business days after the clinic has received the results. If you do not hear from us within 7 days, please contact the clinic through MyChart or phone. If you have a critical or abnormal lab result, we will notify you by phone as soon as possible.  Submit refill requests through [employer] or call your pharmacy and they will forward the refill request to us. Please allow 3 business days for your refill to be completed.          Additional Information About Your Visit        Care EveryWhere ID     This is your Care EveryWhere ID. This could be used by other organizations to access your [place] medical records  [id number]        Your Vitals Were     Pulse Temperature BMI (Body Mass Index)             96 98.4  F (36.9  C) (Oral) 21.19 kg/m2          Blood Pressure from Last 3 Encounters:   11/07/18 134/70   09/12/18 108/56   08/29/18 138/88    Weight from Last 3 Encounters:   11/07/18 119 lb 9.6 oz (54.3 kg)   09/12/18 131 lb (59.4 kg)   08/29/18 133 lb (60.3 kg)              Today, you had the following     No orders found for display       Primary Care Provider Office Phone # Fax #    Michelle Parr PA-C [phone] [address] [facility] [address]        Equal Access to Services     ANGELICA MORA AH: Amber latif  brian Garcia, wapamellada luqadaha, qaybta kaalmada xiang, ambika leidain hayaadorita seamanjoey scottymaribel anuj aravind. So [facility] [phone].    ATENCIÓN: Si francinela adeola, tiene a woods disposición servicios gratuitos de asistencia lingüística. Percy al [phone].    We comply with applicable federal civil rights laws and [place] laws. We do not discriminate on the basis of race, color, national origin, age, disability, sex, sexual orientation, or gender identity.            Thank you!     Thank you for choosing [facility]  for your care. Our goal is always to provide you with excellent care. Hearing back from our patients is one way we can continue to improve our services. Please take a few minutes to complete the written survey that you may receive in the mail after your visit with us. Thank you!             Your Updated Medication List - Protect others around you: Learn how to safely use, store and throw away your medicines at www.disposemymeds.org.          This list is accurate as of 11/7/18 12:05 PM.  Always use your most recent med list.                   Brand Name Dispense Instructions for use Diagnosis    albuterol 108 (90 Base) MCG/ACT inhaler    PROAIR HFA/PROVENTIL HFA/VENTOLIN HFA    1 Inhaler    Inhale 2 puffs into the lungs every 6 hours as needed for shortness of breath / dyspnea or wheezing    Acute bronchitis       * cyclobenzaprine 10 MG tablet    FLEXERIL    30 tablet    Take 0.5-1 tablets (5-10 mg) by mouth 3 times daily as needed for muscle spasms    Trapezius muscle spasm       * cyclobenzaprine 10 MG tablet    FLEXERIL    30 tablet    Take 0.5-1 tablets (5-10 mg) by mouth nightly as needed for muscle spasms    Midline thoracic back pain, unspecified chronicity       ibuprofen 600 MG tablet    ADVIL/MOTRIN    60 tablet    Take 1 tablet (600 mg) by mouth 3 times daily (with meals) For 7-10 days    Lateral epicondylitis of both elbows, Biceps tendonitis, left       omeprazole 20 MG CR  capsule    priLOSEC    30 capsule    Take 1 capsule (20 mg) by mouth daily    Abdominal pain, epigastric       sucralfate 1 GM tablet    CARAFATE    40 tablet    Take 1 tablet (1 g) by mouth 4 times daily    Abdominal pain, epigastric       * Notice:  This list has 2 medication(s) that are the same as other medications prescribed for you. Read the directions carefully, and ask your doctor or other care provider to review them with you.

## 2018-11-07 NOTE — PROGRESS NOTES
SUBJECTIVE:   Jenny Moss is a 58 year old female who presents to clinic today for the following health issues:    Walked in to clinic reporting reflux pain in the center of her chest, back pain, weight loss, loss of appetite, sleeplessness.  Patient has been seen in clinic for chest/back pain previously and is taking omeprazole as prescribed with little relief of symptoms.  She did try exercises for her back as advised but has not noticed any change in her back pain.  She is losing weight as she doesn't have an appetite. She can't sleep due to discomfort.  Denies chest pressure, radiating to arm/jaw, dizziness, lightheadedness, syncope. States pain is almost always present. Reports some shortness of breath but doesn't worsen with activity or when lying down. She has an inhaler but it seems to be malfunctioning.    Objective:  Patient is pleasant, cooperative, and doesn't appear to be in any acute distress. She does look fatigued. Her voice is hoarse.  Lung sounds are clear.     /70 (BP Location: Right arm, Patient Position: Chair, Cuff Size: Adult Regular)  Pulse 96  Temp 98.4  F (36.9  C) (Oral)  Wt 119 lb 9.6 oz (54.3 kg)  BMI 21.19 kg/m2      Plan:  Huddled with PCP, Michelle Parr PA-C who advised patient go to ER - Given symptoms she will likely need imaging and labs. She has failed outpatient treatments for symptoms thus far, per Michelle Parr PA-C and may need CT scan, EGD.  Reviewed with patient who verbalized understanding and will go to [place] ER right away.  Reviewed warning signs and when to seek urgent medical attention.  Patient verbalized understanding.  Patient left in ambulatory condition and had no further questions.    Jamie Gamboa RN

## 2018-11-12 ENCOUNTER — OFFICE VISIT (OUTPATIENT)
Dept: FAMILY MEDICINE | Facility: CLINIC | Age: 58
End: 2018-11-12
Payer: COMMERCIAL

## 2018-11-12 VITALS
DIASTOLIC BLOOD PRESSURE: 62 MMHG | TEMPERATURE: 98.4 F | HEIGHT: 63 IN | OXYGEN SATURATION: 97 % | HEART RATE: 100 BPM | BODY MASS INDEX: 20.73 KG/M2 | WEIGHT: 117 LBS | SYSTOLIC BLOOD PRESSURE: 114 MMHG

## 2018-11-12 DIAGNOSIS — Z11.59 NEED FOR HEPATITIS C SCREENING TEST: ICD-10-CM

## 2018-11-12 DIAGNOSIS — Z13.6 CARDIOVASCULAR SCREENING; LDL GOAL LESS THAN 160: ICD-10-CM

## 2018-11-12 DIAGNOSIS — Z11.4 SCREENING FOR HIV (HUMAN IMMUNODEFICIENCY VIRUS): ICD-10-CM

## 2018-11-12 DIAGNOSIS — J98.59 MEDIASTINAL MASS: Primary | ICD-10-CM

## 2018-11-12 PROCEDURE — 86803 HEPATITIS C AB TEST: CPT | Performed by: PHYSICIAN ASSISTANT

## 2018-11-12 PROCEDURE — 99495 TRANSJ CARE MGMT MOD F2F 14D: CPT | Performed by: PHYSICIAN ASSISTANT

## 2018-11-12 PROCEDURE — 36415 COLL VENOUS BLD VENIPUNCTURE: CPT | Performed by: PHYSICIAN ASSISTANT

## 2018-11-12 PROCEDURE — 80061 LIPID PANEL: CPT | Performed by: PHYSICIAN ASSISTANT

## 2018-11-12 PROCEDURE — 87389 HIV-1 AG W/HIV-1&-2 AB AG IA: CPT | Performed by: PHYSICIAN ASSISTANT

## 2018-11-12 RX ORDER — TRAMADOL HYDROCHLORIDE 50 MG/1
50 TABLET ORAL EVERY 6 HOURS PRN
Qty: 20 TABLET | Refills: 0 | Status: SHIPPED | OUTPATIENT
Start: 2018-11-12 | End: 2019-01-11

## 2018-11-12 NOTE — MR AVS SNAPSHOT
"              After Visit Summary   11/12/2018    Jenny Moss    MRN: [id number]           Patient Information     Date Of Birth          1960        Visit Information        Provider Department      11/12/2018 11:20 AM Michelle Parr PA-C [facility]        Today's Diagnoses     Mediastinal mass    -  1    Need for hepatitis C screening test        Screening for HIV (human immunodeficiency virus)        CARDIOVASCULAR SCREENING; LDL GOAL LESS THAN 160          Care Instructions    Try Tramadol for pain in place of the oxycodone. Hopefully this will still help with the pain but reduce the side effects.   I will have a nurse call you tomorrow to check in.  See me in 2 weeks for full physical.,    Michelle Parr PA-C            Follow-ups after your visit        Who to contact     If you have questions or need follow up information about today's clinic visit or your schedule please contact [facility] directly at [phone].  Normal or non-critical lab and imaging results will be communicated to you by MyChart, letter or phone within 4 business days after the clinic has received the results. If you do not hear from us within 7 days, please contact the clinic through MyChart or phone. If you have a critical or abnormal lab result, we will notify you by phone as soon as possible.  Submit refill requests through [employer] or call your pharmacy and they will forward the refill request to us. Please allow 3 business days for your refill to be completed.          Additional Information About Your Visit        Care EveryWhere ID     This is your Care EveryWhere ID. This could be used by other organizations to access your [place] medical records  [id number]        Your Vitals Were     Pulse Temperature Height Pulse Oximetry BMI (Body Mass Index)       100 98.4  F (36.9  C) (Oral) 5' 3\" (1.6 m) 97% 20.73 kg/m2        Blood Pressure from Last 3 Encounters:   11/12/18 114/62 "   11/07/18 134/70   09/12/18 108/56    Weight from Last 3 Encounters:   11/12/18 117 lb (53.1 kg)   11/07/18 119 lb 9.6 oz (54.3 kg)   09/12/18 131 lb (59.4 kg)              We Performed the Following     Hepatitis C Screen Reflex to HCV RNA Quant and Genotype     HIV Screening     Lipid panel reflex to direct LDL Fasting          Today's Medication Changes          These changes are accurate as of 11/12/18 12:03 PM.  If you have any questions, ask your nurse or doctor.               Start taking these medicines.        Dose/Directions    traMADol 50 MG tablet   Commonly known as:  ULTRAM   Used for:  Mediastinal mass   Started by:  Michelle Parr PA-C        Dose:  50 mg   Take 1 tablet (50 mg) by mouth every 6 hours as needed for severe pain   Quantity:  20 tablet   Refills:  0         These medicines have changed or have updated prescriptions.        Dose/Directions    cyclobenzaprine 10 MG tablet   Commonly known as:  FLEXERIL   This may have changed:  Another medication with the same name was removed. Continue taking this medication, and follow the directions you see here.   Used for:  Midline thoracic back pain, unspecified chronicity   Changed by:  Michelle Parr PA-C        Dose:  5-10 mg   Take 0.5-1 tablets (5-10 mg) by mouth nightly as needed for muscle spasms   Quantity:  30 tablet   Refills:  0         Stop taking these medicines if you haven't already. Please contact your care team if you have questions.     omeprazole 20 MG CR capsule   Commonly known as:  priLOSEC   Stopped by:  Michelle Parr PA-C           sucralfate 1 GM tablet   Commonly known as:  CARAFATE   Stopped by:  Michelle Parr PA-C                Where to get your medicines      Some of these will need a paper prescription and others can be bought over the counter.  Ask your nurse if you have questions.     Bring a paper prescription for each of these medications     traMADol 50 MG tablet                Information about OPIOIDS     PRESCRIPTION OPIOIDS: WHAT YOU NEED TO KNOW   We gave you an opioid (narcotic) pain medicine. It is important to manage your pain, but opioids are not always the best choice. You should first try all the other options your care team gave you. Take this medicine for as short a time (and as few doses) as possible.    Some activities can increase your pain, such as bandage changes or therapy sessions. It may help to take your pain medicine 30 to 60 minutes before these activities. Reduce your stress by getting enough sleep, working on hobbies you enjoy and practicing relaxation or meditation. Talk to your care team about ways to manage your pain beyond prescription opioids.    These medicines have risks:    DO NOT drive when on new or higher doses of pain medicine. These medicines can affect your alertness and reaction times, and you could be arrested for driving under the influence (DUI). If you need to use opioids long-term, talk to your care team about driving.    DO NOT operate heavy machinery    DO NOT do any other dangerous activities while taking these medicines.    DO NOT drink any alcohol while taking these medicines.     If the opioid prescribed includes acetaminophen, DO NOT take with any other medicines that contain acetaminophen. Read all labels carefully. Look for the word  acetaminophen  or  Tylenol.  Ask your pharmacist if you have questions or are unsure.    You can get addicted to pain medicines, especially if you have a history of addiction (chemical, alcohol or substance dependence). Talk to your care team about ways to reduce this risk.    All opioids tend to cause constipation. Drink plenty of water and eat foods that have a lot of fiber, such as fruits, vegetables, prune juice, apple juice and high-fiber cereal. Take a laxative (Miralax, milk of magnesia, Colace, Senna) if you don t move your bowels at least every other day. Other side effects include upset  stomach, sleepiness, dizziness, throwing up, tolerance (needing more of the medicine to have the same effect), physical dependence and slowed breathing.    Store your pills in a secure place, locked if possible. We will not replace any lost or stolen medicine. If you don t finish your medicine, please throw away (dispose) as directed by your pharmacist. The [place] Pollution Control Agency has more information about safe disposal: https://www.pca.[facility].mn.us/living-green/managing-unwanted-medications         Primary Care Provider Office Phone # Fax #    Michelle Parr PA-C [phone]       [address] [facility] [address]        Equal Access to Services     ANGELICA MORA : Amber Garcia, barbie mg, rich otoole, ambika nazario. So [facility] [phone].    ATENCIÓN: Si habla español, tiene a woods disposición servicios gratuitos de asistencia lingüística. Llame al [phone].    We comply with applicable federal civil rights laws and [place] laws. We do not discriminate on the basis of race, color, national origin, age, disability, sex, sexual orientation, or gender identity.            Thank you!     Thank you for choosing [facility]  for your care. Our goal is always to provide you with excellent care. Hearing back from our patients is one way we can continue to improve our services. Please take a few minutes to complete the written survey that you may receive in the mail after your visit with us. Thank you!             Your Updated Medication List - Protect others around you: Learn how to safely use, store and throw away your medicines at www.disposemymeds.org.          This list is accurate as of 11/12/18 12:03 PM.  Always use your most recent med list.                   Brand Name Dispense Instructions for use Diagnosis    albuterol 108 (90 Base) MCG/ACT inhaler    PROAIR HFA/PROVENTIL HFA/VENTOLIN HFA     1 Inhaler    Inhale 2 puffs into the lungs every 6 hours as needed for shortness of breath / dyspnea or wheezing    Cough       amoxicillin-clavulanate 875-125 MG per tablet    AUGMENTIN     Take 1 tablet by mouth 2 times daily        cyclobenzaprine 10 MG tablet    FLEXERIL    30 tablet    Take 0.5-1 tablets (5-10 mg) by mouth nightly as needed for muscle spasms    Midline thoracic back pain, unspecified chronicity       ibuprofen 600 MG tablet    ADVIL/MOTRIN    60 tablet    Take 1 tablet (600 mg) by mouth 3 times daily (with meals) For 7-10 days    Lateral epicondylitis of both elbows, Biceps tendonitis, left       OXYCODONE HCL PO      Take 5 mg by mouth        traMADol 50 MG tablet    ULTRAM    20 tablet    Take 1 tablet (50 mg) by mouth every 6 hours as needed for severe pain    Mediastinal mass

## 2018-11-12 NOTE — LETTER
November 14, 2018      Jenny Moss  [address] [facility] [address]        Dear Jenny,       The results of your recent lab tests were within normal limits. Enclosed is a copy of these results. If you have any further questions or problems, please contact our office.       Sincerely,        Michelle Parr PA-C    Results for orders placed or performed in visit on 11/12/18   Hepatitis C Screen Reflex to HCV RNA Quant and Genotype   Result Value Ref Range    Hepatitis C Antibody Nonreactive NR^Nonreactive   HIV Screening   Result Value Ref Range    HIV Antigen Antibody Combo Nonreactive NR^Nonreactive       Lipid panel reflex to direct LDL Fasting   Result Value Ref Range    Cholesterol 156 <200 mg/dL    Triglycerides 67 <150 mg/dL    HDL Cholesterol 66 >49 mg/dL    LDL Cholesterol Calculated 77 <100 mg/dL    Non HDL Cholesterol 90 <130 mg/dL

## 2018-11-12 NOTE — PROGRESS NOTES
SUBJECTIVE:   Jenny Moss is a 58 year old female who presents to clinic today for the following health issues:    Hospital Follow-up Visit:    Hospital/Nursing Home/IP Rehab Facility: [facility]   Date of Admission: 11/08/2018  Date of Discharge: 11/10/2018  Reason(s) for Admission: Pneumonia             Problems taking medications regularly:  Oxycodone causes nausea, has not been able to take this. Is requesting to try something different for pain.       Medication changes since discharge: Amoxicillin and oxycodone        Problems adhering to non-medication therapy:  Not able to use Oxycodone due to side effect. Is hoping to try a different pain medication.  Fell into the wall yesterday after taking the pain mediation. Caught herself on the wall, denies head injury or loss of consciousness, just off balance.  Tried APAP x 2 days, and tried to take half dose.    Summary of hospitalization:  CareEverywhere information obtained and reviewed  Diagnostic Tests/Treatments reviewed.  Follow up needed: F/u with Oncology, pathology results are pending.  Other Healthcare Providers Involved in Patient s Care:         Specialist appointment - TBD  Update since discharge: stable, denies any worsening chest pain, SOB, palpitations. Continues to have pressure in chest.    Post Discharge Medication Reconciliation: discharge medications reconciled and changed, per note/orders (see AVS).  Plan of care communicated with patient     Coding guidelines for this visit:  Type of Medical   Decision Making Face-to-Face Visit       within 7 Days of discharge Face-to-Face Visit        within 14 days of discharge   Moderate Complexity 04818 [id number]   High Complexity 38175 10248          -------------------------------------    Problem list and histories reviewed & adjusted, as indicated.  Additional history: as documented    Reviewed and updated as needed this visit by clinical staff       Reviewed and updated as needed this visit by  "Provider         ROS:  Constitutional, HEENT, cardiovascular, pulmonary, gi and gu systems are negative, except as otherwise noted.    OBJECTIVE:     /62 (Cuff Size: Adult Regular)  Pulse 100  Temp 98.4  F (36.9  C) (Oral)  Ht 5' 3\" (1.6 m)  Wt 117 lb (53.1 kg)  SpO2 97%  BMI 20.73 kg/m2  Body mass index is 20.73 kg/(m^2).  GENERAL: healthy, alert and no distress  NECK: no adenopathy, no asymmetry, masses, or scars and thyroid normal to palpation  RESP: lungs clear to auscultation - no rales, rhonchi or wheezes  CV: regular rate and rhythm, normal S1 S2, no S3 or S4, no murmur, click or rub, no peripheral edema and peripheral pulses strong  ABDOMEN: soft, nontender, no hepatosplenomegaly, no masses and bowel sounds normal  MS: no gross musculoskeletal defects noted, no edema  PSYCH: mentation appears normal, affect normal/bright    Diagnostic Test Results:  none     ASSESSMENT/PLAN:   1. Mediastinal mass  Presented to ED with worsening chest pain in setting of new weight loss, CT scan with mediastinal mass, biopsy results pending but cancer suspected.  Has not tolerated oxycodone, will therefore try the below mediation with food.  - traMADol (ULTRAM) 50 MG tablet; Take 1 tablet (50 mg) by mouth every 6 hours as needed for severe pain  Dispense: 20 tablet; Refill: 0    2. Need for hepatitis C screening test  - Hepatitis C Screen Reflex to HCV RNA Quant and Genotype    3. Screening for HIV (human immunodeficiency virus)  - HIV Screening    4. CARDIOVASCULAR SCREENING; LDL GOAL LESS THAN 160  Recommended that Jenny return to clinic in 2 weeks for f/u, review of labs and to get her up to date with physical exam and all cancer screenings.  She agrees.  - Lipid panel reflex to direct LDL Fasting      Patient Instructions   Try Tramadol for pain in place of the oxycodone. Hopefully this will still help with the pain but reduce the side effects.   I will have a nurse call you tomorrow to check in.  See me in " 2 weeks for full physical.,    LATASHA Troncoso PA-C  [facility]

## 2018-11-12 NOTE — LETTER
[facility]  [address] [facility] [id number] [phone]          November 12, 2018    RE:  Jenny Moss                                                                                                                                                       [address] [facility] [address]            To whom it may concern:    Jenny Moss is under my professional care. I am recommending that she remain off of work until her pain is better controlled.  We are also waiting on lab results to determine her course of treatment.    Sincerely,        Michelle Parr PA-C

## 2018-11-12 NOTE — PATIENT INSTRUCTIONS
Try Tramadol for pain in place of the oxycodone. Hopefully this will still help with the pain but reduce the side effects.   I will have a nurse call you tomorrow to check in.  See me in 2 weeks for full physical.,    Michelle Parr PA-C

## 2018-11-13 LAB
CHOLEST SERPL-MCNC: 156 MG/DL
HCV AB SERPL QL IA: NONREACTIVE
HDLC SERPL-MCNC: 66 MG/DL
HIV 1+2 AB+HIV1 P24 AG SERPL QL IA: NONREACTIVE
LDLC SERPL CALC-MCNC: 77 MG/DL
NONHDLC SERPL-MCNC: 90 MG/DL
TRIGL SERPL-MCNC: 67 MG/DL

## 2018-11-16 ENCOUNTER — PATIENT OUTREACH (OUTPATIENT)
Dept: CARE COORDINATION | Facility: CLINIC | Age: 58
End: 2018-11-16

## 2018-11-16 NOTE — LETTER
Health Care Home - Access Care Plan    About Me  Patient Name:  Jenny Moss    YOB: 1960  Age:                             58 year old   Aash[facility] MRN:            [id number] Telephone Information:     Home Phone [phone]   Mobile Not on file.       Address:    [address] [place] [address] Email address:  No e-mail address on record      Emergency Contact(s)  Name Relationship Lgl Grd Work Phone Home Phone Mobile Phone   1. SOHAN GARCIA* Friend   [phone]    2. NO SECONDARY C* Other   NONE              Health Maintenance: Routine Health maintenance Reviewed: Due/Overdue: (PHQ-2, Tetanus, Mammo, Pap, Colon, ADV DIR)    My Access Plan  Medical Emergency 911   Questions or concerns during clinic hours Primary Clinic Line, I will call the clinic directly: [facility] - [phone]   24 Hour Appointment Line [phone] or  [phone]) (toll free)   24 Hour Nurse Line [phone] (toll free)   Questions or concerns outside clinic hours 24 Hour Appointment Line, I will call the after-hours on-call line:   [facility] [phone] or [phone]) (toll-free)   Preferred Urgent Care Other   Preferred Hospital [facility]  [phone]   Preferred Pharmacy [facility] [address] [place] [address] SO.     Behavioral Health Crisis Line The National Suicide Prevention Lifeline at 1-967.750.3819 or 911     My Care Team Members  Patient Care Team       Relationship Specialty Notifications Start End    Michelle Parr PA-C PCP - General Family Practice All results, Admissions 4/18/11     Phone: [phone] Fax: [phone]         [address] [facility] [address]    Marianne Boyle BSW Lead Care Coordinator Primary Care - CC  11/16/18     Phone: [phone] Fax: [phone]               My Medical and Care Information  Problem List   Patient Active  Problem List   Diagnosis     CARDIOVASCULAR SCREENING; LDL GOAL LESS THAN 160     Tobacco abuse

## 2018-11-16 NOTE — LETTER
[place] CARE COORDINATION    November 16, 2018    Jenny Moss  [address] [address]      Dear Jenny,    Thank you for your time today.  I have sent the application for the [employer].  I am also sending resources for the disability process.      The clinic care coordinator is a registered nurse and/or  who understand the health care system. The goal of clinic care coordination is to help you manage your health and improve access to the [facility] system in the most efficient manner. The registered nurse can assist you in meeting your health care goals by providing education, coordinating services, and strengthening the communication among your providers. The  can assist you with financial, behavioral, psychosocial, chemical dependency, counseling, and/or psychiatric resources.    Please feel free to contact me at [phone] with any questions or concerns. We at [facility] are focused on providing you with the highest-quality healthcare experience possible and that all starts with you.     Sincerely,     Marianne Boyle, QUEENIE, MSW    Clinical Care Coordination  [facility]  [phone]    Enclosed: I have enclosed a copy of a 24 Hour Access Plan. This has helpful phone numbers for you to call when needed. Please keep this in an easy to access place to use as needed.

## 2018-11-16 NOTE — PROGRESS NOTES
"Clinic Care Coordination Contact-Social Work Initial Call/Assessment  Care Team Conversations    Psychosocial: Patient requested to talk with a SW to discuss financial assistance, assistance with navigating the medical system.  Per referral, Patient recently diagnosed with cancer.  Per recent telephone call, Patient is requesting a \"liaison between she and her doctors\".      Call to Patient.  NOEL introduced self and clinic role.  Patient reports she has a new cancer diagnosis and will start treatment next week.  She cannot recall the building she will have treatment, she stated that this is very near [facility].  Patient stated she cannot currently work due to her diagnosis and she is very weak.  Patient questions if she can ever return to work.  Patient questioned about disability resources and would like information on this process.  SW informed Patient this can be a daunting and lengthy process, Patient questioned if there is legal assistance.  SW stated there are legal resources that work specifically with disability.  Patient requested information on these resources.      Patient reports she has states insurance but expressed concern about paying her bills.  SW discussed the [employer].  Patient stated she does not have access to a computer, SW stated she will print off this application and mail to Patient if she would like.  Patient accepted.  SW inquired if Patient has cash and food resources with current state assistance, Patient states she does not.  SW encouraged Patient to apply for these resources as may be eligible now that she is not working.  Patient in agreement.  Patient states she has personal car.  SW and Patient agreed that Patient will apply for benefits within the next two weeks.  SW will do further assessment on next call.  Patient is still dealing with new diagnosis and sounded overwhelmed on the phone.      SW and Patient agreed on return call in 1-2 weeks.  Patient states she has " SW's contact information from PCP.  SW verified Patient's address.    SW sent [employer] brochure and legal resources, those that also assist with completion of applications to Patient     [employer]-Free disability evaluation for your patients  [phone] and ask about filing an initial application or an appeal    [employer]-assisting with applications   [phone]  [email or web address]      Plan:   1) Patient will apply cash and food resources within the next 2 weeks  2) SW will send introduction letter, Access and Complex Care Plan to Patient today.  NOEL will send [employer] application and  for disability process as requested   3) NOEL will update PCP and call Patient in 1-2 weeks     QUEENIE Bernstein, MSW   [facility]   [phone]  11/16/2018 12:38 PM

## 2018-11-16 NOTE — LETTER
[facility]  Complex Care Plan  About Me  Patient Name:  Jenny Moss    YOB: 1960  Age:     58 year old   [place] MRN:   [id number] Telephone Information:    Home Phone [phone]   Mobile Not on file.       Address:    [address] [facility] [address] Email address:  No e-mail address on record      Emergency Contact(s)  Name Relationship Lgl Grd Work Phone Home Phone Mobile Phone   1. SOHAN GARCIA* Friend   [phone]    2. NO SECONDARY C* Other   NONE            Primary language:  English     needed? No   [facility] [employer]:  [phone] op. 1  Other communication barriers:    Preferred Method of Communication: Needs assessment   Current living arrangement:  Needs assessment   Mobility Status/ Medical Equipment:  Not assessed     Health Maintenance  Health Maintenance Reviewed: Due/Overdue: (PHQ-2, Tetanus, Mammo, Pap, Colon, ADV DIR)    My Access Plan  Medical Emergency 911   Primary Clinic Line [facility] - [phone]   24 Hour Appointment Line [phone] or  [phone]) (toll-free)   24 Hour Nurse Line [phone] (toll-free)   Preferred Urgent Care Other   Preferred Hospital [facility]  [phone]   Preferred Pharmacy [facility] [address] [place] [address] SO.     Behavioral Health Crisis Line The National Suicide Prevention Lifeline at 1-468.195.1290 or 911     My Care Team Members    Patient Care Team       Relationship Specialty Notifications Start End    Michelle Parr PA-C PCP - General Family Practice All results, Admissions 4/18/11     Phone: [phone] Fax: [phone]         [address] [facility] [address]    Marianne Boyle BSW Lead Care Coordinator Primary Care - CC  11/16/18     Phone: [phone] Fax: [phone]                My Care Plans  Self Management and Treatment Plan  Goals and (Comments)  Goals         General    1. I will call/visit [facility] within the next 2 weeks to apply for cash and food assistance  (pt-stated)     Notes - Note created  11/16/2018 12:18 PM by Marianne Boyle BSW    Goal Statement: I will call/visit [facility] within the next 2 weeks to apply for cash and food assistance   Measure of Success: Call/visit [facility] within the next 2 weeks to apply for cash and food assistance   Supportive Steps to Achieve: Call/visit [facility] within the next 2 weeks to apply for cash and food assistance   Barriers: Unable to work due to new cancer diagnosis  Strengths: Patient has personal vehicle to complete above goal, she is motivated to obtain additional resources   Date to Achieve By: 12/3/2018 (due to holiday)   Patient expressed understanding of goal: Yes    Information also given to Patient for [employer]                Action Plans on File: None         Advance Care Plans/Directives Type: None        My Medical and Care Information  Problem List   Patient Active Problem List   Diagnosis     CARDIOVASCULAR SCREENING; LDL GOAL LESS THAN 160     Tobacco abuse          Care Coordination Start Date: 11/16/2018   Frequency of Care Coordination: 2 weeks   Form Last Updated: 11/16/2018

## 2018-11-19 ENCOUNTER — TELEPHONE (OUTPATIENT)
Dept: FAMILY MEDICINE | Facility: CLINIC | Age: 58
End: 2018-11-19

## 2018-11-19 NOTE — TELEPHONE ENCOUNTER
Called and spoke with patient. She is in need of a letter for her job. She says can't pick anything up heavy because she has cancer in her chest. She would like to be on light duty for now. She can't go back to work until 12/5 and would like to start light duty at that time. Will route to provider to advise.     Andreas Vargas RN

## 2018-11-19 NOTE — TELEPHONE ENCOUNTER
Reason for Call:  Other     Detailed comments: Patient is requesting for a work restriction note from pcp, per patient she stated pcp already knows her condition. Please advise.     Phone Number Patient can be reached at: Home number on file [phone] (home)    Best Time: Anytime    Can we leave a detailed message on this number? YES    Call taken on 11/19/2018 at 1:25 PM by Yeny Reed

## 2018-11-19 NOTE — LETTER
[facility]  [address] [place] [address] [id number] [phone]          November 20, 2018    RE:  Jenny Moss                                                                                                                                                       [address] [facility] [address]            To whom it may concern:    Jenny Moss is under my professional care for She  may return to work with the following: The employee is UNABLE to return to work until 12/5/18.    When the patient returns to work, I am recommending light duty until further notice.      Sincerely,            Michelle Parr PA-C

## 2018-11-20 NOTE — TELEPHONE ENCOUNTER
Letter walked to , placed in  drawer, and documented in book.    Spoke with patient to inform.    Thanks!  Andreas Bates

## 2018-11-27 ENCOUNTER — TRANSFERRED RECORDS (OUTPATIENT)
Dept: HEALTH INFORMATION MANAGEMENT | Facility: CLINIC | Age: 58
End: 2018-11-27

## 2018-12-07 ENCOUNTER — PATIENT OUTREACH (OUTPATIENT)
Dept: CARE COORDINATION | Facility: CLINIC | Age: 58
End: 2018-12-07

## 2018-12-07 NOTE — PROGRESS NOTES
"Clinic Care Coordination Contact--Social Work Follow Up Call/Assessment   Care Team Conversations    Psychosocial: Patient requested to talk with a SW to discuss financial assistance, assistance with navigating the medical system.  Per referral, Patient recently diagnosed with cancer.  Per recent telephone call, Patient is requesting a \"liaison between she and her doctors\".       Call to Patient.  NOEL introduced self and clinic role.  Patient reports she has a new cancer diagnosis and will start treatment next week.  She cannot recall the building she will have treatment, she stated that this is very near [facility].  Patient stated she cannot currently work due to her diagnosis and she is very weak.  Patient questions if she can ever return to work.  Patient questioned about disability resources and would like information on this process.  SW informed Patient this can be a daunting and lengthy process, Patient questioned if there is legal assistance.  SW stated there are legal resources that work specifically with disability.  Patient requested information on these resources.       Patient reports she has states insurance but expressed concern about paying her bills.  SW discussed the [employer].  Patient stated she does not have access to a computer, SW stated she will print off this application and mail to Patient if she would like.  Patient accepted.  SW inquired if Patient has cash and food resources with current state assistance, Patient states she does not.  SW encouraged Patient to apply for these resources as may be eligible now that she is not working.  Patient in agreement.  Patient states she has personal car.  SW and Patient agreed that Patient will apply for benefits within the next two weeks.  SW will do further assessment on next call.  Patient is still dealing with new diagnosis and sounded overwhelmed on the phone.      SW had sent [employer] brochure and legal resources, those that " "also assist with completion of applications to Patient      [employer]-Free disability evaluation for your patients  [phone] and ask about filing an initial application or an appeal     [employer]-assisting with applications   [phone]  [email or web address]    Call to Patient for update.  She reports she received information sent by NOEL.  She stated having started the process for application for food and cash assistance, more information is needed on her income, which she is providing.  Patient states her first cancer treatment is Tuesday 12/11/2018.  We agreed on return call later that week for further needs assessment.      QUEENIE Bernstein, WESLEY   [facility]   [phone]  12/7/2018 11:10 AM    12/14/2018:    Call to Patient for update.  She reports having applied for Social Security.  Patient was very fatigued and this call short.  We agreed to talk again in a few weeks.  Patient planned to schedule appointment with PCP to \"touch base\".  Since Patient has many things in process and seems overwhelmed, we agreed to talk again in a few weeks.  It seems Patient's first treatment is next week, which will also be overwhelming.      Plan: 1) SW will call Patient in 3-4 weeks for update and needs assessment, will update PCP    QUEENIE Bernstein, WESLEY   [facility]   [phone]  12/14/2018 4:37 PM        "

## 2019-01-04 ENCOUNTER — TRANSFERRED RECORDS (OUTPATIENT)
Dept: HEALTH INFORMATION MANAGEMENT | Facility: CLINIC | Age: 59
End: 2019-01-04

## 2019-01-11 ENCOUNTER — ANCILLARY PROCEDURE (OUTPATIENT)
Dept: GENERAL RADIOLOGY | Facility: CLINIC | Age: 59
End: 2019-01-11
Payer: COMMERCIAL

## 2019-01-11 ENCOUNTER — OFFICE VISIT (OUTPATIENT)
Dept: FAMILY MEDICINE | Facility: CLINIC | Age: 59
End: 2019-01-11
Payer: COMMERCIAL

## 2019-01-11 ENCOUNTER — TELEPHONE (OUTPATIENT)
Dept: FAMILY MEDICINE | Facility: CLINIC | Age: 59
End: 2019-01-11

## 2019-01-11 VITALS
HEART RATE: 89 BPM | DIASTOLIC BLOOD PRESSURE: 60 MMHG | TEMPERATURE: 97.8 F | HEIGHT: 63 IN | RESPIRATION RATE: 22 BRPM | BODY MASS INDEX: 19.14 KG/M2 | SYSTOLIC BLOOD PRESSURE: 122 MMHG | OXYGEN SATURATION: 100 % | WEIGHT: 108 LBS

## 2019-01-11 DIAGNOSIS — M25.551 HIP PAIN, RIGHT: ICD-10-CM

## 2019-01-11 DIAGNOSIS — Z12.31 VISIT FOR SCREENING MAMMOGRAM: ICD-10-CM

## 2019-01-11 DIAGNOSIS — M25.551 HIP PAIN, RIGHT: Primary | ICD-10-CM

## 2019-01-11 DIAGNOSIS — C38.3: ICD-10-CM

## 2019-01-11 PROCEDURE — 99214 OFFICE O/P EST MOD 30 MIN: CPT | Performed by: NURSE PRACTITIONER

## 2019-01-11 PROCEDURE — 73502 X-RAY EXAM HIP UNI 2-3 VIEWS: CPT | Mod: FY

## 2019-01-11 RX ORDER — OXYCODONE HYDROCHLORIDE 5 MG/1
TABLET ORAL
Refills: 0 | COMMUNITY
Start: 2019-01-04 | End: 2019-01-11

## 2019-01-11 RX ORDER — OXYCODONE HYDROCHLORIDE 5 MG/1
5 TABLET ORAL EVERY 6 HOURS PRN
Qty: 20 TABLET | Refills: 0 | Status: SHIPPED | OUTPATIENT
Start: 2019-01-11

## 2019-01-11 SDOH — HEALTH STABILITY: MENTAL HEALTH: HOW OFTEN DO YOU HAVE A DRINK CONTAINING ALCOHOL?: NEVER

## 2019-01-11 ASSESSMENT — PAIN SCALES - GENERAL: PAINLEVEL: WORST PAIN (10)

## 2019-01-11 ASSESSMENT — MIFFLIN-ST. JEOR: SCORE: 1049.01

## 2019-01-11 NOTE — PROGRESS NOTES
SUBJECTIVE:   Jenny Moss is a 58 year old female who presents to clinic today for the following health issues:      Right Hip Pain    Onset: weeks    Description:   Location: right hip  Character: Cramping- pulsing    Intensity: severe    Progression of Symptoms: worse    Accompanying Signs & Symptoms:  Other symptoms: radiation of pain to right thigh    History:   Previous similar pain: no       Precipitating factors:   Trauma or overuse: no     Alleviating factors:  Improved by: only pain med (oxycodone) from oncology    Therapies Tried and outcome: nothing helps   No known injury.    Has h/o carcinoma of the mediastinum recently diagnosed 11/9/18.  She is seeing Luis Toledo NP for a Palliative visit on 1/14/19.  States her Oncologist is Dr. Key.  She is having initial radiation treatment in 5 days through [place] [facility] on 1/16/19.  She is also planning to get chemo.    Problem list and histories reviewed & adjusted, as indicated.  Additional history: as documented    Current Outpatient Medications   Medication Sig Dispense Refill     albuterol (PROAIR HFA/PROVENTIL HFA/VENTOLIN HFA) 108 (90 Base) MCG/ACT inhaler Inhale 2 puffs into the lungs every 6 hours as needed for shortness of breath / dyspnea or wheezing 1 Inhaler 0     cyclobenzaprine (FLEXERIL) 10 MG tablet Take 0.5-1 tablets (5-10 mg) by mouth nightly as needed for muscle spasms 30 tablet 0     ibuprofen (ADVIL/MOTRIN) 600 MG tablet Take 1 tablet (600 mg) by mouth 3 times daily (with meals) For 7-10 days 60 tablet 0            amoxicillin-clavulanate (AUGMENTIN) 875-125 MG per tablet Take 1 tablet by mouth 2 times daily       OXYCODONE HCL PO Take 5 mg by mouth       No Known Allergies     Recent Labs   Lab Test 11/12/18  1214 08/29/18  1324   LDL 77  --    HDL 66  --    TRIG 67  --    ALT  --  18   CR  --  0.59   GFRESTIMATED  --  >90   GFRESTBLACK  --  >90   POTASSIUM  --  4.5   TSH  --  0.86      BP Readings from Last 3 Encounters:  "  01/11/19 122/60   11/12/18 114/62   11/07/18 134/70    Wt Readings from Last 3 Encounters:   01/11/19 49 kg (108 lb)   11/12/18 53.1 kg (117 lb)   11/07/18 54.3 kg (119 lb 9.6 oz)                    Reviewed and updated as needed this visit by clinical staff  Tobacco  Allergies  Meds  Problems  Med Hx  Surg Hx  Fam Hx       Reviewed and updated as needed this visit by Provider  Tobacco  Allergies  Meds  Problems  Med Hx  Surg Hx  Fam Hx         ROS:  Constitutional, HEENT, cardiovascular, pulmonary, gi and gu systems are negative, except as otherwise noted.    OBJECTIVE:     /60 (BP Location: Right arm, Patient Position: Chair, Cuff Size: Adult Regular)   Pulse 89   Temp 97.8  F (36.6  C) (Oral)   Resp 22   Ht 1.6 m (5' 3\")   Wt 49 kg (108 lb)   SpO2 100%   BMI 19.13 kg/m    Body mass index is 19.13 kg/m .  GENERAL: alert, no distress and thin  RESP: lungs clear to auscultation - no rales, rhonchi or wheezes  CV: regular rate and rhythm, normal S1 S2, no S3 or S4, no murmur, click or rub, no peripheral edema and peripheral pulses strong  MS: no tenderness to right hip, full ROM of right hip, no tenderness to right knee.  Tenderness to right SI joint.    Diagnostic Test Results:  Xray Pelvis and Hip Right:  Radiologist FINDINGS: Hip joint spaces are preserved bilaterally. No significant osseous degenerative change. No malalignment. Sacroiliac joints appear patent and symmetric.                                                      IMPRESSION: Osseous structures appear normal.    ASSESSMENT/PLAN:     1. Hip pain, right    - oxyCODONE (ROXICODONE) 5 MG tablet; Take 1 tablet (5 mg) by mouth every 6 hours as needed for severe pain  Dispense: 20 tablet; Refill: 0  - XR Pelvis and Hip Right 2 Views; Future  - I have asked patient to also discuss this pain with her Oncology team when she goes to her next appointment in 3 days (Palliative visit).  - Today I did refill the Oxycodone so she can use " over the weekend to get her to her Oncology appointment.    2. Carcinoma of mediastinum (H)  - Managed by [place] [facility], Dr. Key.  - Patient has appointment 1/14/19 with Luis Toledo NP for Palliative visit.  - Initial radiation appointment 1/16/19.  She is also planning to get chemo.    3. Visit for screening mammogram    - MA SCREENING DIGITAL BILAT - Future  (s+30); Future    Return in about 2 weeks (around 1/25/2019) for f/u with PCP, Physical Exam.    Kerry Colmenares NP  [facility]

## 2019-01-11 NOTE — PATIENT INSTRUCTIONS
Future refills of the Oxycodone should be done by the Oncology team    [facility]     Discharged by : Kerry Colmenares NP  Paper scripts provided to patient : oxycodone 5 mg     If you have any questions regarding your visit please contact your care team:     Team Gold                Clinic Hours Telephone Number     Dr. Rob Colmenares, ANDREW   7am-7pm  Monday - Thursday   7am-5pm  Fridays  [phone]   (Appointment scheduling available 24/7)     RN Line  [phone] option 2     [facility] [place] [facility] 11am-9pm Monday-Friday Saturday-Sunday- 9am-5pm     [place] -   5pm-9pm Monday-Friday Saturday-Sunday- 9am-5pm    [phone] - [place]    [phone] - [place]     For a Price Quote for your services, please call our Consumer Price Line at [phone].     What options do I have for visits at the clinic other than the traditional office visit?     To expand how we care for you, many of our providers are utilizing electronic visits (e-visits) and telephone visits, when medically appropriate, for interactions with their patients rather than a visit in the clinic. We also offer nurse visits for many medical concerns. Just like any other service, we will bill your insurance company for this type of visit based on time spent on the phone with your provider. Not all insurance companies cover these visits. Please check with your medical insurance if this type of visit is covered. You will be responsible for any charges that are not paid by your insurance.     E-visits via oohilove: generally incur a $35.00 fee.     Telephone visits:  Time spent on the phone: *charged based on time that is spent on the phone in increments of 10 minutes. Estimated cost:   5-10 mins $30.00   11-20 mins. $59.00   21-30 mins. $85.00       Use oohilove (secure email communication and access to your chart) to send your primary care provider a  message or make an appointment. Ask someone on your Team how to sign up for Squarespace.     As always, Thank you for trusting us with your health care needs!      [facility]:    Scheduling Appointments  [place] [place]  Call: [phone]    [place]ard [place], [facility]  Call: [phone]    [facility]  Call: [phone]    For Gastroenterology referrals   [facility]   Clinics and Surgery [place], 4th Floor   [address] [place] [address]   Appointments: [phone]    WHERE TO GO FOR CARE?    Clinic    Make an appointment if you:       Are sick (cold, cough, flu, sore throat, earache or in pain).       Have a small injury (sprain, small cut, burn or broken bone).       Need a physical exam, Pap smear, vaccine or prescription refill.       Have questions about your health or medicines.    To reach us:      Call [phone] ([phone]). Open 24 hours every day. (For counseling services, call [phone].)    Log into Squarespace at [employer].[email or web address]. (Visit [employer].org to create an account.) Hospital emergency room    An emergency is a serious or life- threatening problem that must be treated right away.    Call 182 or get to the hospital if you have:      Very bad or sudden:            - Chest pain or pressure         - Bleeding         - Head or belly pain         - Dizziness or trouble seeing, walking or                          Speaking      Problems breathing      Blood in your vomit or you are coughing up blood      A major injury (knocked out, loss of a finger or limb, rape, broken bone protruding from skin)    A mental health crisis. (Or call the Mental Health Crisis line at 1-476.164.9037 or Suicide Prevention Hotline at 1-462.809.9142.)    Open 24 hours every day. You don't need an appointment.     Urgent care    Visit urgent care for sickness or small injuries when the clinic is closed. You  don't need an appointment. To check hours or find an urgent care near you, visit [email or web address]. Online care    Get online care from [facility] for more than 70 common problems, like colds, allergies and infections. Open 24 hours every day at:   [email or web address]   Need help deciding?    For advice about where to be seen, you may call your clinic and ask to speak with a nurse. We're here for you 24 hours every day.         If you are deaf or hard of hearing, please let us know. We provide many free services including sign language interpreters, oral interpreters, TTYs, telephone amplifiers, note takers and written materials.

## 2019-01-11 NOTE — TELEPHONE ENCOUNTER
I called and left a voicemail (she told me today in clinic ok to leave detailed message) for patient letting her know her x-ray was normal.  Advised to call if she has questions.    Kerry Colmenares, BOSTON, APRN, CNP

## 2019-01-11 NOTE — Clinical Note
Vinay Gomez, I saw your patient today for hip pain.  She is having a Palliative visit and first radiation next week.Kerry

## 2019-01-12 PROBLEM — J98.59 MEDIASTINAL MASS: Status: ACTIVE | Noted: 2018-11-08

## 2019-01-12 PROBLEM — C38.3: Status: ACTIVE | Noted: 2019-01-12

## 2019-01-12 PROBLEM — C38.3: Status: ACTIVE | Noted: 2018-11-09

## 2019-01-17 ENCOUNTER — TRANSFERRED RECORDS (OUTPATIENT)
Dept: HEALTH INFORMATION MANAGEMENT | Facility: CLINIC | Age: 59
End: 2019-01-17

## 2019-01-24 ENCOUNTER — TRANSFERRED RECORDS (OUTPATIENT)
Dept: HEALTH INFORMATION MANAGEMENT | Facility: CLINIC | Age: 59
End: 2019-01-24

## 2019-01-28 ENCOUNTER — PATIENT OUTREACH (OUTPATIENT)
Dept: CARE COORDINATION | Facility: CLINIC | Age: 59
End: 2019-01-28

## 2019-01-28 ASSESSMENT — ACTIVITIES OF DAILY LIVING (ADL)
DEPENDENT_IADLS:: INDEPENDENT
DEPENDENT_IADLS:: INDEPENDENT

## 2019-01-28 NOTE — PROGRESS NOTES
Clinic Care Coordination Contact--Social Work Follow Up Call/Assessment    Clinic Care Coordination Contact  OUTREACH     Referral Information:  Supportive resources for Patient's new cancer diagnosis        Primary Diagnosis: Psychosocial    Chief Complaint   Patient presents with     Clinic Care Coordination - Follow-up     NOEL     Chart Review Please     NOEL        Universal Utilization: Patient now seeing oncology      Utilization    Last refreshed: 1/27/2019  3:45 AM:  Hospital Admissions 0           Last refreshed: 1/27/2019  3:45 AM:  ED Visits 0           Last refreshed: 1/27/2019  3:45 AM:  No Show Count (past year) 2              Current as of: 1/27/2019  3:45 AM              Clinical Concerns:s:  Not assessed   Current Medical Concerns:  Not assessed   Current Behavioral Concerns: Stable   Education Provided to patient:      Health Maintenance Reviewed:  Overdue/Due for mammogram, DTAP TDAP, pap, colon cancer screen, [place], Advanced Directive planning   Clinical Pathway: None    Medication Management:  Patient reports she takes as prescribed, can currently afford medications     Functional Status:  Dependent ADLs:: Independent  Dependent IADLs:: Independent  Bed or wheelchair confined:: No    Living Situation:  Current living arrangement:: I live alone  Type of residence:: Private home - stairs    Diet/Exercise/Sleep:       Transportation:  Transportation concerns (GOAL):: No  Transportation means:: Accessible car     Psychosocial:  Jehovah's witness or spiritual beliefs that impact treatment:: No  Mental health DX:: No  Mental health management concern (GOAL):: No     Financial/Insurance:   Financial/Insurance concerns (GOAL):: Yes  Patient reported having applied for state assistance, this is in process.  She reports having completed Social Security application with NOEL in oncology, she also applied for financial assistance with [employer]      Resources and Interventions:  Current Resources:     Community  Resources: None  Supplies used at home:: None  Equipment Currently Used at Home: none    Advance Care Plan/Directive  Advanced Care Plans/Directives on file:: No  Advanced Care Plan/Directive Status: Not Applicable    Referrals Placed: None     Goals: Patient goal was to apply for state assistance, which is in process     Patient/Caregiver understanding: Patient will continue to work with oncology SW     Outreach Frequency: No further SW intervention       Plan: There will be no further SW intervention as Patient now working with oncology SW     QUEENIE Bernstein, MSW   [facility]   [phone]  1/28/2019 4:23 PM

## 2019-02-07 ENCOUNTER — TRANSFERRED RECORDS (OUTPATIENT)
Dept: HEALTH INFORMATION MANAGEMENT | Facility: CLINIC | Age: 59
End: 2019-02-07

## 2019-02-21 ENCOUNTER — TRANSFERRED RECORDS (OUTPATIENT)
Dept: HEALTH INFORMATION MANAGEMENT | Facility: CLINIC | Age: 59
End: 2019-02-21

## 2019-03-06 ENCOUNTER — TRANSFERRED RECORDS (OUTPATIENT)
Dept: HEALTH INFORMATION MANAGEMENT | Facility: CLINIC | Age: 59
End: 2019-03-06

## 2019-03-21 ENCOUNTER — TRANSFERRED RECORDS (OUTPATIENT)
Dept: HEALTH INFORMATION MANAGEMENT | Facility: CLINIC | Age: 59
End: 2019-03-21

## 2019-04-29 ENCOUNTER — TRANSFERRED RECORDS (OUTPATIENT)
Dept: HEALTH INFORMATION MANAGEMENT | Facility: CLINIC | Age: 59
End: 2019-04-29

## 2019-05-14 ENCOUNTER — TRANSFERRED RECORDS (OUTPATIENT)
Dept: HEALTH INFORMATION MANAGEMENT | Facility: CLINIC | Age: 59
End: 2019-05-14

## 2019-05-24 ENCOUNTER — TRANSFERRED RECORDS (OUTPATIENT)
Dept: HEALTH INFORMATION MANAGEMENT | Facility: CLINIC | Age: 59
End: 2019-05-24

## 2019-06-04 ENCOUNTER — TRANSFERRED RECORDS (OUTPATIENT)
Dept: HEALTH INFORMATION MANAGEMENT | Facility: CLINIC | Age: 59
End: 2019-06-04

## 2019-06-18 ENCOUNTER — TRANSFERRED RECORDS (OUTPATIENT)
Dept: HEALTH INFORMATION MANAGEMENT | Facility: CLINIC | Age: 59
End: 2019-06-18

## 2019-07-01 ENCOUNTER — TRANSFERRED RECORDS (OUTPATIENT)
Dept: HEALTH INFORMATION MANAGEMENT | Facility: CLINIC | Age: 59
End: 2019-07-01

## 2019-07-09 ENCOUNTER — TRANSFERRED RECORDS (OUTPATIENT)
Dept: HEALTH INFORMATION MANAGEMENT | Facility: CLINIC | Age: 59
End: 2019-07-09

## 2019-08-02 ENCOUNTER — TRANSFERRED RECORDS (OUTPATIENT)
Dept: HEALTH INFORMATION MANAGEMENT | Facility: CLINIC | Age: 59
End: 2019-08-02

## 2019-09-30 ENCOUNTER — TRANSFERRED RECORDS (OUTPATIENT)
Dept: HEALTH INFORMATION MANAGEMENT | Facility: CLINIC | Age: 59
End: 2019-09-30

## 2019-10-09 ENCOUNTER — TELEPHONE (OUTPATIENT)
Dept: FAMILY MEDICINE | Facility: CLINIC | Age: 59
End: 2019-10-09

## 2019-10-09 NOTE — TELEPHONE ENCOUNTER
Panel Management Review      Patient has the following on her problem list: None      Composite cancer screening  Chart review shows that this patient is due/due soon for the following None and Cancer screens done through Oncology   Summary:    Patient is due/failing the following:   none    Action needed:   none    Type of outreach:    none    Questions for provider review:    None                                                                                                                                    Agnieszka Cook